# Patient Record
Sex: MALE | Race: BLACK OR AFRICAN AMERICAN | NOT HISPANIC OR LATINO | URBAN - METROPOLITAN AREA
[De-identification: names, ages, dates, MRNs, and addresses within clinical notes are randomized per-mention and may not be internally consistent; named-entity substitution may affect disease eponyms.]

---

## 2020-03-07 ENCOUNTER — EMERGENCY (EMERGENCY)
Facility: HOSPITAL | Age: 45
LOS: 1 days | Discharge: HOME | End: 2020-03-07
Attending: EMERGENCY MEDICINE
Payer: MEDICARE

## 2020-03-07 VITALS
RESPIRATION RATE: 18 BRPM | WEIGHT: 210.1 LBS | DIASTOLIC BLOOD PRESSURE: 82 MMHG | TEMPERATURE: 99 F | OXYGEN SATURATION: 98 % | SYSTOLIC BLOOD PRESSURE: 135 MMHG | HEART RATE: 98 BPM | HEIGHT: 66 IN

## 2020-03-07 PROCEDURE — 94640 AIRWAY INHALATION TREATMENT: CPT

## 2020-03-07 PROCEDURE — 99284 EMERGENCY DEPT VISIT MOD MDM: CPT

## 2020-03-07 PROCEDURE — 99283 EMERGENCY DEPT VISIT LOW MDM: CPT | Mod: 25

## 2020-03-07 RX ORDER — IPRATROPIUM/ALBUTEROL SULFATE 18-103MCG
3 AEROSOL WITH ADAPTER (GRAM) INHALATION ONCE
Refills: 0 | Status: COMPLETED | OUTPATIENT
Start: 2020-03-07 | End: 2020-03-07

## 2020-03-07 RX ORDER — ALBUTEROL 90 UG/1
2 AEROSOL, METERED ORAL
Qty: 1 | Refills: 0
Start: 2020-03-07 | End: 2020-03-07

## 2020-03-07 RX ADMIN — Medication 60 MILLIGRAM(S): at 14:28

## 2020-03-07 RX ADMIN — Medication 3 MILLILITER(S): at 14:28

## 2020-03-07 NOTE — ED PROVIDER NOTE - OBJECTIVE STATEMENT
44M h/o asthma presents to the ED c/o non productive cough x 1 week. Pt otherwise denies fever/chills, c/p, sob, abdominal pain, n/v, numbness/tinging, recent travel, +sick contacts and has no other complaints.

## 2020-03-07 NOTE — ED PROVIDER NOTE - PATIENT PORTAL LINK FT
You can access the FollowMyHealth Patient Portal offered by [facility] by registering at the following website: [email or web address]. By joining [employer]’s FollowMyHealth portal, you will also be able to view your health information using other applications (apps) compatible with our system.

## 2020-03-07 NOTE — ED PROVIDER NOTE - ATTENDING CONTRIBUTION TO CARE
I, Camron Cyr, performed a face to face bedside interview with this patient regarding history of present illness, review of symptoms and relevant past medical, social and family history.  I completed an independent physical examination. I have communicated the patient’s plan of care and disposition with the ACP.    44M h/o asthma presents to the ED c/o non productive cough x 1 week. Pt otherwise denies fever/chills, c/p, sob ; pe awake alert in nad heent ncat neck supple cor s1 s2 lungs mild wheeze b/l abd soft   dx asthma; nebs, steroids , reeval

## 2020-03-07 NOTE — ED PROVIDER NOTE - PROGRESS NOTE DETAILS
Pts lungs CTA b/l after nebs-- improved clinically. D/w Dr. Cyr-- pt stable for d/c with prednisone/albuterol and PCP f/u.

## 2020-03-22 ENCOUNTER — EMERGENCY (EMERGENCY)
Facility: HOSPITAL | Age: 45
LOS: 1 days | Discharge: HOME | End: 2020-03-22
Attending: EMERGENCY MEDICINE
Payer: MEDICARE

## 2020-03-22 VITALS
TEMPERATURE: 99 F | SYSTOLIC BLOOD PRESSURE: 146 MMHG | DIASTOLIC BLOOD PRESSURE: 77 MMHG | HEIGHT: 67 IN | WEIGHT: 209 LBS | RESPIRATION RATE: 20 BRPM | HEART RATE: 102 BPM | OXYGEN SATURATION: 93 %

## 2020-03-22 VITALS
RESPIRATION RATE: 20 BRPM | SYSTOLIC BLOOD PRESSURE: 139 MMHG | OXYGEN SATURATION: 97 % | HEART RATE: 103 BPM | DIASTOLIC BLOOD PRESSURE: 82 MMHG | TEMPERATURE: 99 F

## 2020-03-22 LAB
ALBUMIN SERPL ELPH-MCNC: 4 G/DL — SIGNIFICANT CHANGE UP (ref 3.3–5.2)
ALP SERPL-CCNC: 73 U/L — SIGNIFICANT CHANGE UP (ref 40–120)
ALT FLD-CCNC: 21 U/L — SIGNIFICANT CHANGE UP
ANION GAP SERPL CALC-SCNC: 15 MMOL/L — SIGNIFICANT CHANGE UP (ref 5–17)
ANISOCYTOSIS BLD QL: SLIGHT — SIGNIFICANT CHANGE UP
AST SERPL-CCNC: 14 U/L — SIGNIFICANT CHANGE UP
BASOPHILS # BLD AUTO: 0 K/UL — SIGNIFICANT CHANGE UP (ref 0–0.2)
BASOPHILS NFR BLD AUTO: 0 % — SIGNIFICANT CHANGE UP (ref 0–2)
BILIRUB SERPL-MCNC: <0.2 MG/DL — LOW (ref 0.4–2)
BUN SERPL-MCNC: 18 MG/DL — SIGNIFICANT CHANGE UP (ref 8–20)
CALCIUM SERPL-MCNC: 8.8 MG/DL — SIGNIFICANT CHANGE UP (ref 8.6–10.2)
CHLORIDE SERPL-SCNC: 101 MMOL/L — SIGNIFICANT CHANGE UP (ref 98–107)
CO2 SERPL-SCNC: 25 MMOL/L — SIGNIFICANT CHANGE UP (ref 22–29)
CREAT SERPL-MCNC: 1.1 MG/DL — SIGNIFICANT CHANGE UP (ref 0.5–1.3)
EOSINOPHIL # BLD AUTO: 0 K/UL — SIGNIFICANT CHANGE UP (ref 0–0.5)
EOSINOPHIL NFR BLD AUTO: 0 % — SIGNIFICANT CHANGE UP (ref 0–6)
GIANT PLATELETS BLD QL SMEAR: PRESENT — SIGNIFICANT CHANGE UP
GLUCOSE SERPL-MCNC: 110 MG/DL — HIGH (ref 70–99)
HCT VFR BLD CALC: 38.1 % — LOW (ref 39–50)
HGB BLD-MCNC: 12.2 G/DL — LOW (ref 13–17)
HYPOCHROMIA BLD QL: SLIGHT — SIGNIFICANT CHANGE UP
LACTATE BLDV-MCNC: 1.3 MMOL/L — SIGNIFICANT CHANGE UP (ref 0.5–2)
LYMPHOCYTES # BLD AUTO: 0.45 K/UL — LOW (ref 1–3.3)
LYMPHOCYTES # BLD AUTO: 8.1 % — LOW (ref 13–44)
MACROCYTES BLD QL: SLIGHT — SIGNIFICANT CHANGE UP
MANUAL SMEAR VERIFICATION: SIGNIFICANT CHANGE UP
MCHC RBC-ENTMCNC: 28.7 PG — SIGNIFICANT CHANGE UP (ref 27–34)
MCHC RBC-ENTMCNC: 32 GM/DL — SIGNIFICANT CHANGE UP (ref 32–36)
MCV RBC AUTO: 89.6 FL — SIGNIFICANT CHANGE UP (ref 80–100)
MICROCYTES BLD QL: SLIGHT — SIGNIFICANT CHANGE UP
MONOCYTES # BLD AUTO: 1.26 K/UL — HIGH (ref 0–0.9)
MONOCYTES NFR BLD AUTO: 22.5 % — HIGH (ref 2–14)
NEUTROPHILS # BLD AUTO: 3.79 K/UL — SIGNIFICANT CHANGE UP (ref 1.8–7.4)
NEUTROPHILS NFR BLD AUTO: 67.6 % — SIGNIFICANT CHANGE UP (ref 43–77)
PLAT MORPH BLD: NORMAL — SIGNIFICANT CHANGE UP
PLATELET # BLD AUTO: 149 K/UL — LOW (ref 150–400)
POLYCHROMASIA BLD QL SMEAR: SLIGHT — SIGNIFICANT CHANGE UP
POTASSIUM SERPL-MCNC: 3.4 MMOL/L — LOW (ref 3.5–5.3)
POTASSIUM SERPL-SCNC: 3.4 MMOL/L — LOW (ref 3.5–5.3)
PROT SERPL-MCNC: 6.8 G/DL — SIGNIFICANT CHANGE UP (ref 6.6–8.7)
RBC # BLD: 4.25 M/UL — SIGNIFICANT CHANGE UP (ref 4.2–5.8)
RBC # FLD: 14.2 % — SIGNIFICANT CHANGE UP (ref 10.3–14.5)
RBC BLD AUTO: ABNORMAL
SMUDGE CELLS # BLD: PRESENT — SIGNIFICANT CHANGE UP
SODIUM SERPL-SCNC: 141 MMOL/L — SIGNIFICANT CHANGE UP (ref 135–145)
VARIANT LYMPHS # BLD: 1.8 % — SIGNIFICANT CHANGE UP (ref 0–6)
WBC # BLD: 5.61 K/UL — SIGNIFICANT CHANGE UP (ref 3.8–10.5)
WBC # FLD AUTO: 5.61 K/UL — SIGNIFICANT CHANGE UP (ref 3.8–10.5)

## 2020-03-22 PROCEDURE — 71045 X-RAY EXAM CHEST 1 VIEW: CPT | Mod: 26

## 2020-03-22 PROCEDURE — 71045 X-RAY EXAM CHEST 1 VIEW: CPT

## 2020-03-22 PROCEDURE — 93010 ELECTROCARDIOGRAM REPORT: CPT

## 2020-03-22 PROCEDURE — 93005 ELECTROCARDIOGRAM TRACING: CPT

## 2020-03-22 PROCEDURE — 96374 THER/PROPH/DIAG INJ IV PUSH: CPT

## 2020-03-22 PROCEDURE — 94640 AIRWAY INHALATION TREATMENT: CPT

## 2020-03-22 PROCEDURE — 99284 EMERGENCY DEPT VISIT MOD MDM: CPT

## 2020-03-22 PROCEDURE — 83605 ASSAY OF LACTIC ACID: CPT

## 2020-03-22 PROCEDURE — 36415 COLL VENOUS BLD VENIPUNCTURE: CPT

## 2020-03-22 PROCEDURE — 99285 EMERGENCY DEPT VISIT HI MDM: CPT | Mod: 25

## 2020-03-22 PROCEDURE — 85027 COMPLETE CBC AUTOMATED: CPT

## 2020-03-22 PROCEDURE — 80053 COMPREHEN METABOLIC PANEL: CPT

## 2020-03-22 RX ORDER — IPRATROPIUM/ALBUTEROL SULFATE 18-103MCG
1 AEROSOL WITH ADAPTER (GRAM) INHALATION ONCE
Refills: 0 | Status: COMPLETED | OUTPATIENT
Start: 2020-03-22 | End: 2020-03-22

## 2020-03-22 RX ORDER — ALBUTEROL 90 UG/1
2 AEROSOL, METERED ORAL
Qty: 1 | Refills: 0
Start: 2020-03-22 | End: 2020-04-20

## 2020-03-22 RX ORDER — MAGNESIUM SULFATE 500 MG/ML
2 VIAL (ML) INJECTION ONCE
Refills: 0 | Status: COMPLETED | OUTPATIENT
Start: 2020-03-22 | End: 2020-03-22

## 2020-03-22 RX ORDER — SODIUM CHLORIDE 9 MG/ML
1000 INJECTION INTRAMUSCULAR; INTRAVENOUS; SUBCUTANEOUS ONCE
Refills: 0 | Status: COMPLETED | OUTPATIENT
Start: 2020-03-22 | End: 2020-03-22

## 2020-03-22 RX ORDER — AZITHROMYCIN 500 MG/1
1 TABLET, FILM COATED ORAL
Qty: 3 | Refills: 0
Start: 2020-03-22 | End: 2020-03-24

## 2020-03-22 RX ADMIN — SODIUM CHLORIDE 1000 MILLILITER(S): 9 INJECTION INTRAMUSCULAR; INTRAVENOUS; SUBCUTANEOUS at 11:58

## 2020-03-22 RX ADMIN — Medication 50 GRAM(S): at 11:57

## 2020-03-22 RX ADMIN — Medication 50 MILLIGRAM(S): at 11:57

## 2020-03-22 RX ADMIN — Medication 1 PUFF(S): at 11:58

## 2020-03-22 NOTE — ED PROVIDER NOTE - NSFOLLOWUPINSTRUCTIONS_ED_ALL_ED_FT
take meds as prescribed  drink lots of fluids  stay at home until symptoms resolve  wash hands frequently

## 2020-03-22 NOTE — ED PROVIDER NOTE - CLINICAL SUMMARY MEDICAL DECISION MAKING FREE TEXT BOX
45 y/o male with unchanged SOB and dry cough x week and a half.  pt has mild  tachypnea and hypoxia poor aeration on auscultation. will obtain chest xray to rule out pneumonia, labs, will give steroids , combivent, IV fluids, magnesium, EKG and will reassess.

## 2020-03-22 NOTE — ED ADULT TRIAGE NOTE - CHIEF COMPLAINT QUOTE
patient seen for continuos cough, patient with aide - patient states that he has asthma and that he has the pump. but continues to cough

## 2020-03-22 NOTE — ED PROVIDER NOTE - PATIENT PORTAL LINK FT
You can access the FollowMyHealth Patient Portal offered by [facility] by registering at the following website: [email or web address]. By joining [employer]’s FollowMyHealth portal, you will also be able to view your health information using other applications (apps) compatible with our system.

## 2020-03-22 NOTE — ED PROVIDER NOTE - OBJECTIVE STATEMENT
43 y/o male with dry cough and SOB x 1 week and a half. Pt denies HA/ N/V/D, fever/chills, chest or abdominal pain. No recent travel. Pt is asthmatic. Pt was here a week ago for same complaint. pt was given an inhaler with no relief.

## 2020-03-22 NOTE — ED PROVIDER NOTE - ATTENDING CONTRIBUTION TO CARE
Oneil MARTINEZ- 43 Y/O M with h/o paranoid schizophrenia, asthma, lives with brother who is HCP at bedside p/w persistent cough for 10 days no sputum production but now sob, no fever, chills, nausea, vomiting. Pt was given albuterol neb and steroids w/o relief, non-smoker. Pt went to day program and was sent to ED    Pt is alert, well appearing male, speaking in full sentences, appear to have upper airway congestion, nasal secretions but no drooling b/l poor aeration but equal breath sounds and no wheezing, trachea midlines, mild tachypnea, mild hypoxia to 93% at RA, no retraction or acute resp distress, abd soft, nt, nd, neuro exam aox4, cn 2-12 intact, no focal deficits, skin warm, dry, mod turgor    Plan to treat with combivent inhaler, prednisone, Mag iv and check labs, cxr, hydrate, ekg and reassess

## 2020-03-22 NOTE — ED PROVIDER NOTE - PROGRESS NOTE DETAILS
pt discharged home with azithro, prednisone, advsid to continue labuterol an combivent, flovent at home

## 2020-03-28 ENCOUNTER — EMERGENCY (EMERGENCY)
Facility: HOSPITAL | Age: 45
LOS: 1 days | Discharge: HOME | End: 2020-03-28
Attending: EMERGENCY MEDICINE | Admitting: EMERGENCY MEDICINE
Payer: MEDICARE

## 2020-03-28 VITALS
SYSTOLIC BLOOD PRESSURE: 145 MMHG | HEART RATE: 86 BPM | HEIGHT: 67 IN | RESPIRATION RATE: 16 BRPM | OXYGEN SATURATION: 99 % | TEMPERATURE: 98 F | DIASTOLIC BLOOD PRESSURE: 71 MMHG | WEIGHT: 210.1 LBS

## 2020-03-28 PROCEDURE — 99283 EMERGENCY DEPT VISIT LOW MDM: CPT

## 2020-03-28 RX ORDER — FLUTICASONE PROPIONATE 50 MCG
1 SPRAY, SUSPENSION NASAL
Qty: 1 | Refills: 0
Start: 2020-03-28 | End: 2020-04-26

## 2020-03-28 NOTE — ED PROVIDER NOTE - CARE PROVIDER_API CALL
Hazel Espinoza (DO)  [place] [address]  Phone: [phone]  Fax: [phone]  Follow Up Time:

## 2020-03-28 NOTE — ED PROVIDER NOTE - OBJECTIVE STATEMENT
pt is a 43yo male with pmhx of schizophrenia presents with cough x 6 weeks. pt reports nasal congestion with productive cough with green sputum. pt used cough drops and vicks which provided relief. pt seen at [facility] a few days ago and had neg work up, neg cxr.

## 2020-03-28 NOTE — ED PROVIDER NOTE - NSFOLLOWUPINSTRUCTIONS_ED_ALL_ED_FT
1. FOLLOW UP WITH YOUR PRIMARY DOCTOR IN 24-48 HOURS.   2. FOLLOW UP WITH ALL SPECIALIST DISCUSSED DURING YOUR VISIT.   3. TAKE ALL MEDICATIONS PRESCRIBED IN THE ER IF ANY ARE PRESCRIBED. CONTINUE YOUR HOME MEDICATIONS UNLESS OTHERWISE ADVISED DIFFERENTLY.   4. RETURN FOR WORSENING SYMPTOMS OR CONCERNS INCLUDING BUT NOT LIMITED TO FEVER, CHEST PAIN, OR TROUBLE BREATHING OR ANY OTHER CONCERNS  continue home medications prescribed from [facility]. add flonase and tessalon.

## 2020-03-28 NOTE — ED PROVIDER NOTE - ATTENDING CONTRIBUTION TO CARE
45 yo male pmhx of schizophrenia c/o cough x 6 weeks with some nasal congestion with green sputum.  Taking cough drops and vicks which helped him, [facility], work up negative and CXR clear.    Gen: Alert, NAD  Head/eyes: NC/AT, PERRL  ENT: airway patent  Neck: supple, no tenderness/meningismus/JVD, Trachea midline  Pulm/lung: Bilateral clear BS, normal resp effort, no wheeze/stridor/retractions  CV/heart: RRR, no M/R/G, +2 dist pulses (radial, pedal DP/PT, popliteal)  GI/Abd: soft, NT/ND, +BS, no guarding/rebound tenderness  Musculoskeletal: no edema/erythema/cyanosis, FROM in all extremities, no C/T/L spine ttp  Skin: no rash, no vesicles, no petechaie, no ecchymosis, no swelling  Neuro: AAOx3, CN 2-12 intact, normal sensation, 5/5 motor strength in all extremities    reassurance likely viral syndrome, f/u with pmd as needed

## 2020-03-28 NOTE — ED PROVIDER NOTE - PATIENT PORTAL LINK FT
You can access the FollowMyHealth Patient Portal offered by [facility] by registering at the following website: [email or web address]. By joining [employer]’s FollowMyHealth portal, you will also be able to view your health information using other applications (apps) compatible with our system.

## 2020-03-28 NOTE — ED ADULT NURSE NOTE - NSIMPLEMENTINTERV_GEN_ALL_ED
Implemented All Universal Safety Interventions:  [place] to call system. Call bell, personal items and telephone within reach. Instruct patient to call for assistance. Room bathroom lighting operational. Non-slip footwear when patient is off stretcher. Physically safe environment: no spills, clutter or unnecessary equipment. Stretcher in lowest position, wheels locked, appropriate side rails in place.

## 2020-03-28 NOTE — ED PROVIDER NOTE - CLINICAL SUMMARY MEDICAL DECISION MAKING FREE TEXT BOX
pt with probable viral syndrome. advised pmd follow up. will rx flonase and tessalon. All questions answered and concerns addressed. pt verbalized understanding and agreement with plan and dx. pt advised on next step and when/where to follow up. pt advised on all take home and otc medications. pt advised to follow up with PMD. pt advised to return to ed for worsenng symptoms including fever, cp, sob. will dc.

## 2020-05-23 ENCOUNTER — EMERGENCY (EMERGENCY)
Facility: HOSPITAL | Age: 45
LOS: 1 days | Discharge: HOME | End: 2020-05-23
Attending: EMERGENCY MEDICINE | Admitting: EMERGENCY MEDICINE
Payer: MEDICARE

## 2020-05-23 VITALS
WEIGHT: 210.1 LBS | SYSTOLIC BLOOD PRESSURE: 120 MMHG | HEIGHT: 67 IN | DIASTOLIC BLOOD PRESSURE: 70 MMHG | RESPIRATION RATE: 18 BRPM | HEART RATE: 99 BPM | OXYGEN SATURATION: 97 % | TEMPERATURE: 99 F

## 2020-05-23 VITALS
HEART RATE: 89 BPM | TEMPERATURE: 98 F | RESPIRATION RATE: 16 BRPM | DIASTOLIC BLOOD PRESSURE: 76 MMHG | OXYGEN SATURATION: 98 % | SYSTOLIC BLOOD PRESSURE: 134 MMHG

## 2020-05-23 LAB
ALBUMIN SERPL ELPH-MCNC: 3.3 G/DL — SIGNIFICANT CHANGE UP (ref 3.3–5)
ALP SERPL-CCNC: 63 U/L — SIGNIFICANT CHANGE UP (ref 40–120)
ALT FLD-CCNC: 27 U/L — SIGNIFICANT CHANGE UP (ref 12–78)
ANION GAP SERPL CALC-SCNC: 4 MMOL/L — LOW (ref 5–17)
AST SERPL-CCNC: 13 U/L — LOW (ref 15–37)
BASOPHILS # BLD AUTO: 0.02 K/UL — SIGNIFICANT CHANGE UP (ref 0–0.2)
BASOPHILS NFR BLD AUTO: 0.3 % — SIGNIFICANT CHANGE UP (ref 0–2)
BILIRUB SERPL-MCNC: 0.2 MG/DL — SIGNIFICANT CHANGE UP (ref 0.2–1.2)
BUN SERPL-MCNC: 18 MG/DL — SIGNIFICANT CHANGE UP (ref 7–23)
CALCIUM SERPL-MCNC: 8.3 MG/DL — LOW (ref 8.5–10.1)
CHLORIDE SERPL-SCNC: 109 MMOL/L — HIGH (ref 96–108)
CO2 SERPL-SCNC: 29 MMOL/L — SIGNIFICANT CHANGE UP (ref 22–31)
CREAT SERPL-MCNC: 1 MG/DL — SIGNIFICANT CHANGE UP (ref 0.5–1.3)
D DIMER BLD IA.RAPID-MCNC: <150 NG/ML DDU — SIGNIFICANT CHANGE UP
EOSINOPHIL # BLD AUTO: 0.03 K/UL — SIGNIFICANT CHANGE UP (ref 0–0.5)
EOSINOPHIL NFR BLD AUTO: 0.5 % — SIGNIFICANT CHANGE UP (ref 0–6)
GLUCOSE SERPL-MCNC: 79 MG/DL — SIGNIFICANT CHANGE UP (ref 70–99)
HCT VFR BLD CALC: 37.8 % — LOW (ref 39–50)
HGB BLD-MCNC: 12.1 G/DL — LOW (ref 13–17)
IMM GRANULOCYTES NFR BLD AUTO: 0.3 % — SIGNIFICANT CHANGE UP (ref 0–1.5)
LYMPHOCYTES # BLD AUTO: 1.54 K/UL — SIGNIFICANT CHANGE UP (ref 1–3.3)
LYMPHOCYTES # BLD AUTO: 24 % — SIGNIFICANT CHANGE UP (ref 13–44)
MCHC RBC-ENTMCNC: 28.7 PG — SIGNIFICANT CHANGE UP (ref 27–34)
MCHC RBC-ENTMCNC: 32 GM/DL — SIGNIFICANT CHANGE UP (ref 32–36)
MCV RBC AUTO: 89.6 FL — SIGNIFICANT CHANGE UP (ref 80–100)
MONOCYTES # BLD AUTO: 0.78 K/UL — SIGNIFICANT CHANGE UP (ref 0–0.9)
MONOCYTES NFR BLD AUTO: 12.1 % — SIGNIFICANT CHANGE UP (ref 2–14)
NEUTROPHILS # BLD AUTO: 4.04 K/UL — SIGNIFICANT CHANGE UP (ref 1.8–7.4)
NEUTROPHILS NFR BLD AUTO: 62.8 % — SIGNIFICANT CHANGE UP (ref 43–77)
NRBC # BLD: 0 /100 WBCS — SIGNIFICANT CHANGE UP (ref 0–0)
PLATELET # BLD AUTO: 179 K/UL — SIGNIFICANT CHANGE UP (ref 150–400)
POTASSIUM SERPL-MCNC: 4.3 MMOL/L — SIGNIFICANT CHANGE UP (ref 3.5–5.3)
POTASSIUM SERPL-SCNC: 4.3 MMOL/L — SIGNIFICANT CHANGE UP (ref 3.5–5.3)
PROT SERPL-MCNC: 7.2 G/DL — SIGNIFICANT CHANGE UP (ref 6–8.3)
RBC # BLD: 4.22 M/UL — SIGNIFICANT CHANGE UP (ref 4.2–5.8)
RBC # FLD: 14.3 % — SIGNIFICANT CHANGE UP (ref 10.3–14.5)
SODIUM SERPL-SCNC: 142 MMOL/L — SIGNIFICANT CHANGE UP (ref 135–145)
TROPONIN I SERPL-MCNC: <.015 NG/ML — SIGNIFICANT CHANGE UP (ref 0.01–0.04)
WBC # BLD: 6.43 K/UL — SIGNIFICANT CHANGE UP (ref 3.8–10.5)
WBC # FLD AUTO: 6.43 K/UL — SIGNIFICANT CHANGE UP (ref 3.8–10.5)

## 2020-05-23 PROCEDURE — 93005 ELECTROCARDIOGRAM TRACING: CPT

## 2020-05-23 PROCEDURE — 99283 EMERGENCY DEPT VISIT LOW MDM: CPT

## 2020-05-23 PROCEDURE — 93010 ELECTROCARDIOGRAM REPORT: CPT

## 2020-05-23 PROCEDURE — 99283 EMERGENCY DEPT VISIT LOW MDM: CPT | Mod: 25

## 2020-05-23 PROCEDURE — 80053 COMPREHEN METABOLIC PANEL: CPT

## 2020-05-23 PROCEDURE — 85027 COMPLETE CBC AUTOMATED: CPT

## 2020-05-23 PROCEDURE — 84484 ASSAY OF TROPONIN QUANT: CPT

## 2020-05-23 PROCEDURE — 85379 FIBRIN DEGRADATION QUANT: CPT

## 2020-05-23 PROCEDURE — 71045 X-RAY EXAM CHEST 1 VIEW: CPT

## 2020-05-23 PROCEDURE — 36415 COLL VENOUS BLD VENIPUNCTURE: CPT

## 2020-05-23 PROCEDURE — 71045 X-RAY EXAM CHEST 1 VIEW: CPT | Mod: 26

## 2020-05-23 NOTE — ED PROVIDER NOTE - OBJECTIVE STATEMENT
44 y/o male with PMHx Schizophrenia BIB brother due to cough and SOB. Reports patient has been persistently coughing and worse at night. Reports taking OTC cough medication without relief. Reports testing positive for COVID last month. Denies cp, hemoptysis, leg swelling, calf pain, hx of DVT, palpitations, pain with deep breathing, or any other complaints.

## 2020-05-23 NOTE — ED PROVIDER NOTE - NSFOLLOWUPINSTRUCTIONS_ED_ALL_ED_FT
Shortness of breath (dyspnea) means you have trouble breathing and could indicate a medical problem. Causes include lung disease, heart disease, low amount of red blood cells (anemia), poor physical fitness, being overweight, smoking, etc. Your health care provider today may not be able to find a cause for your shortness of breath after your exam. In this case, it is important to have a follow-up exam with your primary care physician as instructed. If medicines were prescribed, take them as directed for the full length of time directed. Refrain from tobacco products.    Follow up with Pulmonology.     SEEK IMMEDIATE MEDICAL CARE IF YOU HAVE ANY OF THE FOLLOWING SYMPTOMS: worsening shortness of breath, chest pain, back pain, abdominal pain, fever, coughing up blood, lightheadedness/dizziness.

## 2020-05-23 NOTE — ED PROVIDER NOTE - PHYSICAL EXAMINATION
Constitutional: Awake, Alert, non-toxic. NAD. Well appearing, well nourished.   HEAD: Normocephalic, atraumatic.   EYES: EOM intact, conjunctiva and sclera are clear bilaterally.   ENT: No rhinorrhea, patent, mucous membranes pink/moist, no drooling or stridor.   NECK: Supple, non-tender  CARDIOVASCULAR: Normal S1, S2; regular rate and rhythm.  RESPIRATORY: Normal respiratory effort; breath sounds CTAB, no wheezes, rhonchi, or rales. Speaking in full sentences. No accessory muscle use.   ABDOMEN: Soft; non-tender, non-distended.   EXTREMITIES: Full passive and active ROM in all extremities; non-tender to palpation; distal pulses palpable and symmetric, no LE edema, negative Jan sign.   SKIN: Warm, dry; good skin turgor, no apparent lesions or rashes, no ecchymosis, brisk capillary refill.  NEURO: A&O x3. Sensory and motor functions are grossly intact. Speech is normal. Appearance and judgement seem appropriate for gender and age.

## 2020-05-23 NOTE — ED PROVIDER NOTE - PROGRESS NOTE DETAILS
Reevaluated patient at bedside. Labs wnl, negative troponin and D-dimer. Tessalon rxed.  Discussed results with the patient and provided copies.  All questions were answered. Discussed the importance of prompt, close medical follow-up. Patient will return with any changes, concerns or persistent/worsening symptoms.  Patient verbalized understanding.

## 2020-05-23 NOTE — ED PROVIDER NOTE - NSFOLLOWUPCLINICS_GEN_ALL_ED_FT
[facility] Pulmonolgy and Sleep Medicine  Pulmonology  [address] [place] [address], [facility] [address]  [place] [address]  Phone: [phone]  Fax:   Follow Up Time: 1-3 Days

## 2020-05-23 NOTE — ED PROVIDER NOTE - PATIENT PORTAL LINK FT
You can access the FollowMyHealth Patient Portal offered by Health system by registering at the following website: [email or web address]. By joining [employer]’s FollowMyHealth portal, you will also be able to view your health information using other applications (apps) compatible with our system.

## 2020-05-23 NOTE — ED ADULT NURSE REASSESSMENT NOTE - NS ED NURSE REASSESS COMMENT FT1
Patient sitting at the bedside with brother provided with apple and cranberry juice informed of the plan of care with understanding.

## 2020-05-23 NOTE — ED PROVIDER NOTE - ATTENDING CONTRIBUTION TO CARE
46 y/o M paranoid schizophrenic with c/o intermittent cough x few months.  pt was positive for covid in April.  pt well appearing, lungs clear.  xray unremarkable.  EKG with nonspecific ST changes in v3, v4.  labs trop, dc for outp fu.

## 2020-05-23 NOTE — ED PROVIDER NOTE - CLINICAL SUMMARY MEDICAL DECISION MAKING FREE TEXT BOX
c/o cough and SOB. COVID positive last month. PLan includes CXR r/o pneumonia, Ekg r/o CAD. re-assess

## 2020-12-14 ENCOUNTER — INPATIENT (INPATIENT)
Facility: HOSPITAL | Age: 45
LOS: 3 days | Discharge: HOME | DRG: 392 | End: 2020-12-18
Attending: HOSPITALIST | Admitting: STUDENT IN AN ORGANIZED HEALTH CARE EDUCATION/TRAINING PROGRAM
Payer: MEDICARE

## 2020-12-14 VITALS
SYSTOLIC BLOOD PRESSURE: 145 MMHG | RESPIRATION RATE: 18 BRPM | OXYGEN SATURATION: 96 % | HEIGHT: 67 IN | HEART RATE: 90 BPM | TEMPERATURE: 98 F | DIASTOLIC BLOOD PRESSURE: 92 MMHG

## 2020-12-14 PROCEDURE — 99285 EMERGENCY DEPT VISIT HI MDM: CPT

## 2020-12-14 NOTE — ED ADULT TRIAGE NOTE - CHIEF COMPLAINT QUOTE
brother reports pt has had a cough for the past 6 months however recently pt has been choking on his food often, required the hiemlick maneuver twice today. Pt in no distress in triage.

## 2020-12-15 DIAGNOSIS — R13.10 DYSPHAGIA, UNSPECIFIED: ICD-10-CM

## 2020-12-15 DIAGNOSIS — F20.0 PARANOID SCHIZOPHRENIA: ICD-10-CM

## 2020-12-15 DIAGNOSIS — D64.9 ANEMIA, UNSPECIFIED: ICD-10-CM

## 2020-12-15 DIAGNOSIS — R05 COUGH: ICD-10-CM

## 2020-12-15 DIAGNOSIS — Z29.9 ENCOUNTER FOR PROPHYLACTIC MEASURES, UNSPECIFIED: ICD-10-CM

## 2020-12-15 DIAGNOSIS — T17.308A UNSPECIFIED FOREIGN BODY IN LARYNX CAUSING OTHER INJURY, INITIAL ENCOUNTER: ICD-10-CM

## 2020-12-15 LAB
ALBUMIN SERPL ELPH-MCNC: 3.2 G/DL — LOW (ref 3.3–5)
ALBUMIN SERPL ELPH-MCNC: 3.5 G/DL — SIGNIFICANT CHANGE UP (ref 3.3–5)
ALP SERPL-CCNC: 67 U/L — SIGNIFICANT CHANGE UP (ref 40–120)
ALP SERPL-CCNC: 71 U/L — SIGNIFICANT CHANGE UP (ref 40–120)
ALT FLD-CCNC: 24 U/L — SIGNIFICANT CHANGE UP (ref 12–78)
ALT FLD-CCNC: 28 U/L — SIGNIFICANT CHANGE UP (ref 12–78)
ANION GAP SERPL CALC-SCNC: 10 MMOL/L — SIGNIFICANT CHANGE UP (ref 5–17)
ANION GAP SERPL CALC-SCNC: 9 MMOL/L — SIGNIFICANT CHANGE UP (ref 5–17)
APTT BLD: 45.3 SEC — HIGH (ref 27.5–35.5)
AST SERPL-CCNC: 10 U/L — LOW (ref 15–37)
AST SERPL-CCNC: 12 U/L — LOW (ref 15–37)
BILIRUB SERPL-MCNC: 0.2 MG/DL — SIGNIFICANT CHANGE UP (ref 0.2–1.2)
BILIRUB SERPL-MCNC: 0.2 MG/DL — SIGNIFICANT CHANGE UP (ref 0.2–1.2)
BUN SERPL-MCNC: 20 MG/DL — SIGNIFICANT CHANGE UP (ref 7–23)
BUN SERPL-MCNC: 24 MG/DL — HIGH (ref 7–23)
CALCIUM SERPL-MCNC: 8.3 MG/DL — LOW (ref 8.5–10.1)
CALCIUM SERPL-MCNC: 8.3 MG/DL — LOW (ref 8.5–10.1)
CHLORIDE SERPL-SCNC: 106 MMOL/L — SIGNIFICANT CHANGE UP (ref 96–108)
CHLORIDE SERPL-SCNC: 107 MMOL/L — SIGNIFICANT CHANGE UP (ref 96–108)
CO2 SERPL-SCNC: 26 MMOL/L — SIGNIFICANT CHANGE UP (ref 22–31)
CO2 SERPL-SCNC: 29 MMOL/L — SIGNIFICANT CHANGE UP (ref 22–31)
CREAT SERPL-MCNC: 0.94 MG/DL — SIGNIFICANT CHANGE UP (ref 0.5–1.3)
CREAT SERPL-MCNC: 1 MG/DL — SIGNIFICANT CHANGE UP (ref 0.5–1.3)
FERRITIN SERPL-MCNC: 85 NG/ML — SIGNIFICANT CHANGE UP (ref 30–400)
GLUCOSE SERPL-MCNC: 87 MG/DL — SIGNIFICANT CHANGE UP (ref 70–99)
GLUCOSE SERPL-MCNC: 95 MG/DL — SIGNIFICANT CHANGE UP (ref 70–99)
HCT VFR BLD CALC: 37.1 % — LOW (ref 39–50)
HCT VFR BLD CALC: 38.1 % — LOW (ref 39–50)
HGB BLD-MCNC: 11.8 G/DL — LOW (ref 13–17)
HGB BLD-MCNC: 12.3 G/DL — LOW (ref 13–17)
INR BLD: 1.29 RATIO — HIGH (ref 0.88–1.16)
IRON SATN MFR SERPL: 17 % — SIGNIFICANT CHANGE UP (ref 16–55)
IRON SATN MFR SERPL: 57 UG/DL — SIGNIFICANT CHANGE UP (ref 45–165)
MCHC RBC-ENTMCNC: 28.4 PG — SIGNIFICANT CHANGE UP (ref 27–34)
MCHC RBC-ENTMCNC: 29 PG — SIGNIFICANT CHANGE UP (ref 27–34)
MCHC RBC-ENTMCNC: 31.8 GM/DL — LOW (ref 32–36)
MCHC RBC-ENTMCNC: 32.3 GM/DL — SIGNIFICANT CHANGE UP (ref 32–36)
MCV RBC AUTO: 89.4 FL — SIGNIFICANT CHANGE UP (ref 80–100)
MCV RBC AUTO: 89.9 FL — SIGNIFICANT CHANGE UP (ref 80–100)
NRBC # BLD: 0 /100 WBCS — SIGNIFICANT CHANGE UP (ref 0–0)
NRBC # BLD: 0 /100 WBCS — SIGNIFICANT CHANGE UP (ref 0–0)
PLATELET # BLD AUTO: 224 K/UL — SIGNIFICANT CHANGE UP (ref 150–400)
PLATELET # BLD AUTO: 243 K/UL — SIGNIFICANT CHANGE UP (ref 150–400)
POTASSIUM SERPL-MCNC: 3.9 MMOL/L — SIGNIFICANT CHANGE UP (ref 3.5–5.3)
POTASSIUM SERPL-MCNC: 4.3 MMOL/L — SIGNIFICANT CHANGE UP (ref 3.5–5.3)
POTASSIUM SERPL-SCNC: 3.9 MMOL/L — SIGNIFICANT CHANGE UP (ref 3.5–5.3)
POTASSIUM SERPL-SCNC: 4.3 MMOL/L — SIGNIFICANT CHANGE UP (ref 3.5–5.3)
PROT SERPL-MCNC: 7.2 G/DL — SIGNIFICANT CHANGE UP (ref 6–8.3)
PROT SERPL-MCNC: 7.7 G/DL — SIGNIFICANT CHANGE UP (ref 6–8.3)
PROTHROM AB SERPL-ACNC: 14.9 SEC — HIGH (ref 10.6–13.6)
RBC # BLD: 4.15 M/UL — LOW (ref 4.2–5.8)
RBC # BLD: 4.24 M/UL — SIGNIFICANT CHANGE UP (ref 4.2–5.8)
RBC # FLD: 13.5 % — SIGNIFICANT CHANGE UP (ref 10.3–14.5)
RBC # FLD: 13.5 % — SIGNIFICANT CHANGE UP (ref 10.3–14.5)
SARS-COV-2 RNA SPEC QL NAA+PROBE: SIGNIFICANT CHANGE UP
SODIUM SERPL-SCNC: 143 MMOL/L — SIGNIFICANT CHANGE UP (ref 135–145)
SODIUM SERPL-SCNC: 144 MMOL/L — SIGNIFICANT CHANGE UP (ref 135–145)
TIBC SERPL-MCNC: 339 UG/DL — SIGNIFICANT CHANGE UP (ref 220–430)
TSH SERPL-MCNC: 1.03 UIU/ML — SIGNIFICANT CHANGE UP (ref 0.36–3.74)
UIBC SERPL-MCNC: 282 UG/DL — SIGNIFICANT CHANGE UP (ref 110–370)
WBC # BLD: 6.74 K/UL — SIGNIFICANT CHANGE UP (ref 3.8–10.5)
WBC # BLD: 7 K/UL — SIGNIFICANT CHANGE UP (ref 3.8–10.5)
WBC # FLD AUTO: 6.74 K/UL — SIGNIFICANT CHANGE UP (ref 3.8–10.5)
WBC # FLD AUTO: 7 K/UL — SIGNIFICANT CHANGE UP (ref 3.8–10.5)

## 2020-12-15 PROCEDURE — 99222 1ST HOSP IP/OBS MODERATE 55: CPT | Mod: GC,AI

## 2020-12-15 PROCEDURE — 70491 CT SOFT TISSUE NECK W/DYE: CPT | Mod: 26

## 2020-12-15 PROCEDURE — 71260 CT THORAX DX C+: CPT | Mod: 26

## 2020-12-15 PROCEDURE — 70450 CT HEAD/BRAIN W/O DYE: CPT | Mod: 26

## 2020-12-15 PROCEDURE — 93010 ELECTROCARDIOGRAM REPORT: CPT

## 2020-12-15 RX ORDER — CLOZAPINE 150 MG/1
300 TABLET, ORALLY DISINTEGRATING ORAL ONCE
Refills: 0 | Status: COMPLETED | OUTPATIENT
Start: 2020-12-15 | End: 2020-12-15

## 2020-12-15 RX ORDER — DIVALPROEX SODIUM 500 MG/1
1500 TABLET, DELAYED RELEASE ORAL ONCE
Refills: 0 | Status: COMPLETED | OUTPATIENT
Start: 2020-12-15 | End: 2020-12-15

## 2020-12-15 RX ORDER — DIVALPROEX SODIUM 500 MG/1
1500 TABLET, DELAYED RELEASE ORAL AT BEDTIME
Refills: 0 | Status: DISCONTINUED | OUTPATIENT
Start: 2020-12-15 | End: 2020-12-18

## 2020-12-15 RX ORDER — DIVALPROEX SODIUM 500 MG/1
500 TABLET, DELAYED RELEASE ORAL
Refills: 0 | Status: DISCONTINUED | OUTPATIENT
Start: 2020-12-15 | End: 2020-12-18

## 2020-12-15 RX ORDER — PANTOPRAZOLE SODIUM 20 MG/1
40 TABLET, DELAYED RELEASE ORAL
Refills: 0 | Status: DISCONTINUED | OUTPATIENT
Start: 2020-12-15 | End: 2020-12-17

## 2020-12-15 RX ORDER — PANTOPRAZOLE SODIUM 20 MG/1
40 TABLET, DELAYED RELEASE ORAL ONCE
Refills: 0 | Status: COMPLETED | OUTPATIENT
Start: 2020-12-15 | End: 2020-12-15

## 2020-12-15 RX ORDER — CLOZAPINE 150 MG/1
300 TABLET, ORALLY DISINTEGRATING ORAL AT BEDTIME
Refills: 0 | Status: DISCONTINUED | OUTPATIENT
Start: 2020-12-15 | End: 2020-12-18

## 2020-12-15 RX ORDER — SODIUM CHLORIDE 9 MG/ML
1000 INJECTION INTRAMUSCULAR; INTRAVENOUS; SUBCUTANEOUS ONCE
Refills: 0 | Status: COMPLETED | OUTPATIENT
Start: 2020-12-15 | End: 2020-12-15

## 2020-12-15 RX ORDER — CLOZAPINE 150 MG/1
300 TABLET, ORALLY DISINTEGRATING ORAL
Refills: 0 | Status: DISCONTINUED | OUTPATIENT
Start: 2020-12-16 | End: 2020-12-15

## 2020-12-15 RX ORDER — SODIUM CHLORIDE 0.65 %
1 AEROSOL, SPRAY (ML) NASAL
Refills: 0 | Status: DISCONTINUED | OUTPATIENT
Start: 2020-12-15 | End: 2020-12-18

## 2020-12-15 RX ADMIN — DIVALPROEX SODIUM 1500 MILLIGRAM(S): 500 TABLET, DELAYED RELEASE ORAL at 22:34

## 2020-12-15 RX ADMIN — Medication 1 SPRAY(S): at 18:26

## 2020-12-15 RX ADMIN — CLOZAPINE 300 MILLIGRAM(S): 150 TABLET, ORALLY DISINTEGRATING ORAL at 22:39

## 2020-12-15 RX ADMIN — SODIUM CHLORIDE 1000 MILLILITER(S): 9 INJECTION INTRAMUSCULAR; INTRAVENOUS; SUBCUTANEOUS at 01:13

## 2020-12-15 RX ADMIN — Medication 200 MILLIGRAM(S): at 22:36

## 2020-12-15 RX ADMIN — PANTOPRAZOLE SODIUM 40 MILLIGRAM(S): 20 TABLET, DELAYED RELEASE ORAL at 03:47

## 2020-12-15 RX ADMIN — SODIUM CHLORIDE 1000 MILLILITER(S): 9 INJECTION INTRAMUSCULAR; INTRAVENOUS; SUBCUTANEOUS at 03:33

## 2020-12-15 RX ADMIN — DIVALPROEX SODIUM 1500 MILLIGRAM(S): 500 TABLET, DELAYED RELEASE ORAL at 05:51

## 2020-12-15 RX ADMIN — CLOZAPINE 300 MILLIGRAM(S): 150 TABLET, ORALLY DISINTEGRATING ORAL at 05:51

## 2020-12-15 NOTE — ED PROVIDER NOTE - CARE PLAN
Principal Discharge DX:	Dysphagia   Principal Discharge DX:	Dysphagia  Secondary Diagnosis:	Choking

## 2020-12-15 NOTE — SWALLOW BEDSIDE ASSESSMENT ADULT - COMMENTS
Per charting, the patient is a "46 y/o male with PMHx Schizophrenia presents to the ED with 2 episodes of choking. Admitted for dysphagia workup."    CT CHEST 12/15: " Clear lungs without evidence for aspiration pneumonia or pneumonitis. However, mild diffuse bronchial wall thickening is likely inflammatory related to history of bronchial asthma, and less likely due to an infectious bronchitis."    CT HEAD 12/15: "No gross evidence of acute intracranial hemorrhage, midline shift or CT evidence of acute territorial infarct."    Consult received and chart reviewed. The patient was seen at bedside for an assessment of swallow function, at which time he was awake/alert and oriented X3. The patient was able to follow simple directives, answer yes/no questions and communicate basic wants/needs. Patient's brother reported patient with history of chronic cough with subsequent new onset choking episodes. RN present for evaluation.

## 2020-12-15 NOTE — ED ADULT NURSE NOTE - OBJECTIVE STATEMENT
received pt stable awake with c/o  cough x 6months c/o choking  pt evaluated and blood work  drawned and sent to lab ivf infusing well  awaiting ct scan

## 2020-12-15 NOTE — H&P ADULT - PROBLEM SELECTOR PLAN 2
Chronic, stable   - Continue home dose Clozapine   - Continue home dose Depakote  - Chronic, stable   - Continue home dose Clozapine   - Continue home dose Depakote

## 2020-12-15 NOTE — PROGRESS NOTE ADULT - ASSESSMENT
The patient is a 45-year-old man with PMH of Schizophrenia who presented to the ED with two episodes of choking on food. Admitted for dysphagia workup.

## 2020-12-15 NOTE — H&P ADULT - PROBLEM SELECTOR PLAN 3
Patient noted to have H/ HH 12.3/38.1 on admission, microcytic anemia  - no obvious signs or symptoms of bleeding  - ferritin, iron, and TIBC ordered. f/u HH 12.3/38.1 on admission, microcytic anemia. appears to be at baseline per chart review  - no obvious signs or symptoms of bleeding  - ferritin, iron, and TIBC ordered. f/u

## 2020-12-15 NOTE — H&P ADULT - NSHPPHYSICALEXAM_GEN_ALL_CORE
T(C): 37.1 (12-15-20 @ 04:00), Max: 37.1 (12-15-20 @ 04:00)  HR: 87 (12-15-20 @ 04:00) (87 - 90)  BP: 147/90 (12-15-20 @ 04:00) (145/92 - 147/90)  RR: 16 (12-15-20 @ 04:00) (16 - 18)  SpO2: 96% (12-15-20 @ 04:00) (96% - 96%)    GENERAL: patient appears well, no acute distress, appropriately interactive  EYES: sclera clear, no exudates  ENMT: oropharynx clear without erythema, no exudates, moist mucous membranes  NECK: supple, soft  LUNGS: good air entry bilaterally, clear to auscultation, symmetric breath sounds, no wheezing or rhonchi appreciated  HEART: soft S1/S2, regular rate and rhythm, no murmurs noted, no lower extremity edema  GASTROINTESTINAL: abdomen is soft, nontender, nondistended, normoactive bowel sounds  INTEGUMENT: good skin turgor, warm skin, appears well perfused  MUSCULOSKELETAL: no clubbing or cyanosis, no obvious deformity  NEUROLOGIC: awake, alert, oriented x3, good muscle tone in all 4 extremities  HEME/LYMPH: no obvious ecchymosis or petechiae

## 2020-12-15 NOTE — ED ADULT NURSE REASSESSMENT NOTE - NS ED NURSE REASSESS COMMENT FT1
Pt is being evaluated with speech and swallow and recommending mechanical soft
pt reavaluated and  medicated as ordered andadmitted to floor awaiting bed assignment vital signs stable
Received pt alert with brother at bedside. Pt is able to move all extremities. Pt denies chest pain and SOB and abd pain. Pt is having an unproductive cough seems like he is trying to clear his throat but there is nothing to clear. Safety/Comfort maintained. Call bell within reach. Freq rounding performed. Pt is resting comfortable in bed.

## 2020-12-15 NOTE — H&P ADULT - PROBLEM SELECTOR PLAN 1
Patient complaining of multiple episodes of choking earlier today, with associated cough.   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 No evidence of aspiration pneumonitis on CT chest   - Keep patient NPO except meds   - Patient passed bedside speech swallow?   - Speech and swallow eval placed; f/u recommendations   - GI (Dr Arredondo) consulted' f/u recommendations   - ENT?? Patient complaining of multiple episodes of choking earlier today, with associated cough.   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 No evidence of aspiration pneumonitis on CT chest   - Keep patient NPO except meds   - Speech and swallow eval placed; f/u recommendations   - GI (Dr Arredondo) consulted' f/u recommendations   - ENT Consulted f/u recommendations Patient complaining of multiple episodes of choking earlier today, with associated cough.   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 No evidence of aspiration pneumonitis on CT chest   - Keep patient NPO except meds   - Speech and swallow eval placed; f/u recommendations   - GI (Dr Arredondo) consulted' f/u recommendations   - ENT Dr. Soto Consulted f/u recommendations

## 2020-12-15 NOTE — CONSULT NOTE ADULT - PROBLEM SELECTOR RECOMMENDATION 9
44 y/o male with PMHx Schizophrenia presents to the ED with 2 episodes of choking. Admitted for dysphagia workup.   cough reported - pt is a poor historian - spoke with brother - possibly related to choking during PO intake -   SLP eval noted  GI eval noted  ENT eval planned  CT scan neck - chest reviewed -   bronchial thickening likely related to chr cough - and irritation of the airway  tessalon and robitussin PRN - Hycodan PRN for severe cough  planned for Esophagram   on RA - no resp distress -   diff dx of chr cough - GERD - Asthma - Post Nasal Drip - Bronchitis -   cont PPI  work up in progress

## 2020-12-15 NOTE — ED PROVIDER NOTE - OBJECTIVE STATEMENT
brother reports pt has had a cough for the past 6 months however recently pt has been choking on his food often, required the hiemlick maneuver twice today. Pt in no distress in triage.  see chief complaint quote 44 y/o male h/o psych, asthma, his brother reports two episodes of choking on his food and severe distress, the patient appearing cyanotic, then  cleared by the Heimlich maneuver. This happened at two separate meals , when he was eating pasta and later when eating a sandwich.   pt has had a cough for the past 6 months. He is now asymptomatic, no CP, no SOB, no fever, no COVID symptoms, no stroke symptoms 44 y/o male h/o psych, asthma, his brother reports two episodes of choking on his food and severe distress, the patient appearing cyanotic, then  cleared by the Heimlich maneuver. This happened during two  meals , when he was eating pasta and later when eating a sandwich.   pt has had a cough for the past 6 months. He is now asymptomatic, no CP, no SOB, no fever, no COVID symptoms, no stroke symptoms

## 2020-12-15 NOTE — SWALLOW BEDSIDE ASSESSMENT ADULT - ASR SWALLOW ASPIRATION MONITOR
gurgly voice/pneumonia/oral hygiene/throat clearing/upper respiratory infection/change of breathing pattern/position upright (90Y)/cough/fever

## 2020-12-15 NOTE — H&P ADULT - HISTORY OF PRESENT ILLNESS
46 y/o male with PMHx Schizophrenia    In the ED  VS:  Labs: WBC, HH, PLTS, Na, K, Cr, Gluc  Chest Xray:  EKG:   Given in ED: 44 y/o male with PMHx Schizophrenia    In the ED  VS: T: 98.8, HR: 90, BP: 145/92, RR: 16, SpO2: 96% on room air   Labs: WBC: 6.74, Hemoglobin/Hct: 12.3/38.1.  PLTS: 243,  Na, 144 K 3.9, Cr, 1.00 Gluc 87  EKG:   CT chest and neck: 1. No neck mass or lymphadenopathy. 2. Calcification of the distal stylohyoid apparatus bilaterally extending to the hyoid bone (although  from the lesser cornua by noncalcified fibrous tissue), which is of uncertain clinical significance, and may be a normal finding in asymptomatic patients. However, given the clinical history, the possibility of a variant of Eagle's syndrome cannot be excluded. 3. Clear lungs without evidence for aspiration pneumonia or pneumonitis. However, mild diffuse bronchial wall thickening is likely inflammatory related to history of bronchial asthma, and less likely due to an infectious bronchitis.    Given in ED: 1 L NS bolus, Protonix 40 mg IVP x 1 44 y/o male with PMHx Schizophrenia presents to the ED with 2 episodes of choking. Patient is a poor historian. History is taken from brother who is at bedside. Patient was COVID + in April and has been experiencing a chronic dry cough for several months now which has been worked up in [facility] ED twice ( March 2020 and May 2020) and diagnosed as posterior nasal drip. Yesterday, patient was eating pasta around 6pm and suddenly began to choke and gasp for air. He became cyanotic. Brother performed heimlich and cleared food from airway. Patient had a second episode of choking later that evening while eating a sandwich. He became cyanotic again and brother performed heimlich again which cleared food from throat. This has never happened before. Patient does not have issues with swallowing liquids. When asked where does the food get stuck, he points to the middle of his throat. Denies any GERD symptoms, weightloss, hematemesis. No fever, n/v/d, cp, sob.    In the ED  VS: T: 98.8, HR: 90, BP: 145/92, RR: 16, SpO2: 96% on room air   Labs: WBC: 6.74, Hemoglobin/Hct: 12.3/38.1.  PLTS: 243,  Na, 144 K 3.9, Cr, 1.00 Gluc 87  EKG: NSR, nonspecific T wave abnormality in V1-V3  CT chest and neck: 1. No neck mass or lymphadenopathy. 2. Calcification of the distal stylohyoid apparatus bilaterally extending to the hyoid bone (although  from the lesser cornua by noncalcified fibrous tissue), which is of uncertain clinical significance, and may be a normal finding in asymptomatic patients. However, given the clinical history, the possibility of a variant of Eagle's syndrome cannot be excluded. 3. Clear lungs without evidence for aspiration pneumonia or pneumonitis. However, mild diffuse bronchial wall thickening is likely inflammatory related to history of bronchial asthma, and less likely due to an infectious bronchitis.    Given in ED: 1 L NS bolus, Protonix 40 mg IVP x 1 46 y/o male with PMHx Schizophrenia presents to the ED with 2 episodes of choking. Patient is a poor historian. History is taken from brother who is at bedside. Patient was COVID + in April and has been experiencing a chronic dry cough for several months now which has been worked up in [facility] ED twice ( March 2020 and May 2020) and diagnosed as posterior nasal drip. Yesterday, patient was eating pasta around 6pm and suddenly began to choke and gasp for air. He became cyanotic. Brother performed heimlich and cleared food from airway. Patient had a second episode of choking later that evening while eating a sandwich. He became cyanotic again and brother performed heimlich again which cleared food from throat. This has never happened before. Patient does not have issues with swallowing liquids. When asked "where does the food get stuck?" he points to the middle of his throat. Denies any GERD symptoms, weightloss, hematemesis. No fever, n/v/d, cp, sob.    In the ED  VS: T: 98.8, HR: 90, BP: 145/92, RR: 16, SpO2: 96% on room air   Labs: WBC: 6.74, Hemoglobin/Hct: 12.3/38.1.  PLTS: 243,  Na, 144 K 3.9, Cr, 1.00 Gluc 87  EKG: NSR, nonspecific T wave abnormality in V1-V3  CT chest and neck: 1. No neck mass or lymphadenopathy. 2. Calcification of the distal stylohyoid apparatus bilaterally extending to the hyoid bone (although  from the lesser cornua by noncalcified fibrous tissue), which is of uncertain clinical significance, and may be a normal finding in asymptomatic patients. However, given the clinical history, the possibility of a variant of Eagle's syndrome cannot be excluded. 3. Clear lungs without evidence for aspiration pneumonia or pneumonitis. However, mild diffuse bronchial wall thickening is likely inflammatory related to history of bronchial asthma, and less likely due to an infectious bronchitis.    Given in ED: 1 L NS bolus, Protonix 40 mg IVP x 1

## 2020-12-15 NOTE — CONSULT NOTE ADULT - PROBLEM SELECTOR RECOMMENDATION 9
in setting of chronic cough  imaging reviewed  f/u ENT eval  suggest trial of daily ppi  dysphagia diet as per SLP recs  aspiration precautions  monitor diet tolerance   may need to consider egd this admission, will discuss further w pt/brother in setting of chronic cough  imaging reviewed  slp eval noted, ent eval pending  will plan for esophagram in am  dysphagia diet as per SLP recs; npo p mn  aspiration precautions  further gi recs pending above in setting of chronic cough  imaging reviewed  slp eval noted, ent eval pending  will plan for esophagram in am  diet as per SLP recs; npo p mn  further gi recs pending above in setting of chronic cough  imaging reviewed  slp eval noted  ent eval pending  trial of daily ppi  will plan for esophagram in am  diet as per SLP recs, monitor tolerance; npo p mn  further gi recs pending above

## 2020-12-15 NOTE — SWALLOW BEDSIDE ASSESSMENT ADULT - SWALLOW EVAL: DIAGNOSIS
1. The patient demonstrated functional oral management of puree, mechanical soft, regular solids, honey thick liquid, nectar thick liquid and thin liquid textures marked by adequate bolus collection, transfer and posterior transport. 2. The patient demonstrated functional pharyngeal skills for all consistencies trialed marked by suspect timely initiation of swallow trigger with + hyolaryngeal elevation upon digital palpation without evidence of airway penetration. 3. Given chronic cough with subsequent choking episodes on dense solid textures, recommend dysphagia 2 mechanical soft with thin liquids + aspiration precautions.

## 2020-12-15 NOTE — ED PROVIDER NOTE - CLINICAL SUMMARY MEDICAL DECISION MAKING FREE TEXT BOX
multiple episodes of choking and aspiration of food.      plan CT scan admit,   GI consult multiple episodes of choking and aspiration of food.      plan CT scan admit,   GI consult, ENT consult, admit

## 2020-12-15 NOTE — H&P ADULT - NSHPREVIEWOFSYSTEMS_GEN_ALL_CORE
CONSTITUTIONAL: No weakness, fevers or chills  RESPIRATORY: + nonproductive cough, no wheezing or hemoptysis; No shortness of breath  CARDIOVASCULAR: No chest pain or palpitations  GASTROINTESTINAL: No abdominal or epigastric pain. No nausea, vomiting, or hematemesis; No diarrhea or constipation. No melena or hematochezia.  GENITOURINARY: No dysuria, frequency or hematuria  NEUROLOGICAL: No numbness or weakness  SKIN: No itching, burning, rashes, or lesions   All other review of systems is negative unless indicated above.

## 2020-12-15 NOTE — H&P ADULT - ASSESSMENT
44 y/o male with PMHx Schizophrenia presents to the ED with 2 episodes of choking. Admitted for dysphagia workup.

## 2020-12-16 DIAGNOSIS — T17.308A UNSPECIFIED FOREIGN BODY IN LARYNX CAUSING OTHER INJURY, INITIAL ENCOUNTER: ICD-10-CM

## 2020-12-16 DIAGNOSIS — R13.14 DYSPHAGIA, PHARYNGOESOPHAGEAL PHASE: ICD-10-CM

## 2020-12-16 LAB
CRP SERPL-MCNC: 1.34 MG/DL — HIGH (ref 0–0.4)
SARS-COV-2 IGG SERPL QL IA: NEGATIVE — SIGNIFICANT CHANGE UP
SARS-COV-2 IGM SERPL IA-ACNC: 0.26 INDEX — SIGNIFICANT CHANGE UP

## 2020-12-16 PROCEDURE — 99232 SBSQ HOSP IP/OBS MODERATE 35: CPT

## 2020-12-16 PROCEDURE — 74220 X-RAY XM ESOPHAGUS 1CNTRST: CPT | Mod: 26

## 2020-12-16 PROCEDURE — 99233 SBSQ HOSP IP/OBS HIGH 50: CPT

## 2020-12-16 RX ADMIN — Medication 1 SPRAY(S): at 17:18

## 2020-12-16 RX ADMIN — PANTOPRAZOLE SODIUM 40 MILLIGRAM(S): 20 TABLET, DELAYED RELEASE ORAL at 06:28

## 2020-12-16 RX ADMIN — Medication 200 MILLIGRAM(S): at 22:24

## 2020-12-16 RX ADMIN — CLOZAPINE 300 MILLIGRAM(S): 150 TABLET, ORALLY DISINTEGRATING ORAL at 22:24

## 2020-12-16 RX ADMIN — Medication 100 MILLIGRAM(S): at 06:27

## 2020-12-16 RX ADMIN — Medication 1 SPRAY(S): at 06:27

## 2020-12-16 RX ADMIN — DIVALPROEX SODIUM 500 MILLIGRAM(S): 500 TABLET, DELAYED RELEASE ORAL at 06:27

## 2020-12-16 RX ADMIN — DIVALPROEX SODIUM 1500 MILLIGRAM(S): 500 TABLET, DELAYED RELEASE ORAL at 22:25

## 2020-12-16 NOTE — CONSULT NOTE ADULT - SUBJECTIVE AND OBJECTIVE BOX
Chief Complaint:  Patient is a 45y old  Male who presents with a chief complaint of dysphagia (15 Dec 2020 10:36)    Schizophrenia, paranoid    No pertinent past medical history    No significant past surgical history       HPI:  44 y/o male with PMHx Schizophrenia presents to the ED with 2 episodes of choking. Patient is a poor historian. History is taken from brother who is at bedside. Patient was COVID + in April and has been experiencing a chronic dry cough for several months now which has been worked up in [facility] ED twice ( March 2020 and May 2020) and diagnosed as posterior nasal drip. Yesterday, patient was eating pasta around 6pm and suddenly began to choke and gasp for air. He became cyanotic. Brother performed heimlich and cleared food from airway. Patient had a second episode of choking later that evening while eating a sandwich. He became cyanotic again and brother performed heimlich again which cleared food from throat. This has never happened before. Patient does not have issues with swallowing liquids. When asked "where does the food get stuck?" he points to the middle of his throat. Denies any GERD symptoms, weightloss, hematemesis. No fever, n/v/d, cp, sob.    In the ED  VS: T: 98.8, HR: 90, BP: 145/92, RR: 16, SpO2: 96% on room air   Labs: WBC: 6.74, Hemoglobin/Hct: 12.3/38.1.  PLTS: 243,  Na, 144 K 3.9, Cr, 1.00 Gluc 87  EKG: NSR, nonspecific T wave abnormality in V1-V3  CT chest and neck: 1. No neck mass or lymphadenopathy. 2. Calcification of the distal stylohyoid apparatus bilaterally extending to the hyoid bone (although  from the lesser cornua by noncalcified fibrous tissue), which is of uncertain clinical significance, and may be a normal finding in asymptomatic patients. However, given the clinical history, the possibility of a variant of Eagle's syndrome cannot be excluded. 3. Clear lungs without evidence for aspiration pneumonia or pneumonitis. However, mild diffuse bronchial wall thickening is likely inflammatory related to history of bronchial asthma, and less likely due to an infectious bronchitis.    Given in ED: 1 L NS bolus, Protonix 40 mg IVP x 1  (15 Dec 2020 04:45)    gi consulted for choking episode. chart reviewed. pt w multiple prior ed visits for 'coughing.' pt seen and examined earlier this am in ed. brother present and provided most of hx. as per brother, pt has had chronic cough for the past 6 mos. yesterday had episode of choking x 2, once after eating pasta and once after eating a sandwich, pt turned red and was sob, both episodes required brother to perform heimlich in order to dislodge food. states no issue w liquids. upon eval pt sitting up in bed states he feels fine, tolerating oral secretions, states he is hungry and wants to eat. denies f/c, cp/neck pain, dysphagia/odynophagia, n/v/d/c/abd pain. denies prior episodes. has never had egd or colon. no prior abd sx. fhx- father w prostate ca and mother w pancreatic ca; brother also endorse fhx of colon polyps. meds nc. social etoh use, non smoker. discussed possible need of egd pending clinical course however pt refused, although ?unclear regarding pts capacity    recent vs/labs/imaging reviewed- avss on ra  labs reviewed  imaging below      No Known Allergies      cloZAPine 300 milliGRAM(s) Oral <User Schedule>  diVALproex  milliGRAM(s) Oral <User Schedule>  diVALproex DR 1500 milliGRAM(s) Oral at bedtime        FAMILY HISTORY:  Family hx of prostate cancer          Review of Systems:    General:  No wt loss, fevers, chills, night sweats, fatigue  Eyes:  Good vision, no reported pain  ENT:  No sore throat, pain, runny nose, dysphagia  CV:  No pain, palpitations, no lightheadedness  Resp:  sob  GI: see above  :  No pain, bleeding, incontinence, nocturia  Muscle:  No pain, weakness  Neuro:  No weakness, tingling, memory problems  Psych:  No fatigue, insomnia, mood problems, depression  Endocrine:  No polyuria, polydypsia, cold/heat intolerance  Heme:  No petechiae, ecchymosis, easy bruisability  Skin:  No rash, tattoos, scars, edema    Relevant Family History:   n/c    Relevant Social History: n/c      Physical Exam:    Vital Signs:  Vital Signs Last 24 Hrs  T(C): 36.6 (15 Dec 2020 10:54), Max: 37.1 (15 Dec 2020 04:00)  T(F): 97.9 (15 Dec 2020 10:54), Max: 98.7 (15 Dec 2020 04:00)  HR: 82 (15 Dec 2020 10:54) (73 - 90)  BP: 124/79 (15 Dec 2020 10:54) (124/79 - 147/90)  BP(mean): --  RR: 17 (15 Dec 2020 10:54) (16 - 18)  SpO2: 96% (15 Dec 2020 10:54) (96% - 98%)  Daily Height in cm: 170.18 (14 Dec 2020 23:57)    Daily     General: sitting up in bed in nad  HEENT:  NC/AT,  Chest:  resp even non labored on ra no accessory muscle use   Abdomen:  Soft, non-tender, non-distended  Extremities:  no edema  Neuro/Psych:  Awake alert    Laboratory:                            11.8   7.00  )-----------( 224      ( 15 Dec 2020 07:27 )             37.1     12-15    143  |  107  |  20  ----------------------------<  95  4.3   |  26  |  0.94    Ca    8.3<L>      15 Dec 2020 07:27    TPro  7.2  /  Alb  3.2<L>  /  TBili  0.2  /  DBili  x   /  AST  10<L>  /  ALT  24  /  AlkPhos  67  12-15    LIVER FUNCTIONS - ( 15 Dec 2020 07:27 )  Alb: 3.2 g/dL / Pro: 7.2 g/dL / ALK PHOS: 67 U/L / ALT: 24 U/L / AST: 10 U/L / GGT: x           PT/INR - ( 15 Dec 2020 01:12 )   PT: 14.9 sec;   INR: 1.29 ratio         PTT - ( 15 Dec 2020 01:12 )  PTT:45.3 sec      Imaging:      < from: CT Neck Soft Tissue w/ IV Cont (12.15.20 @ 02:41) >    EXAM:  CT NECK SOFT TISSUE IC                          EXAM:  CT CHEST IC                            PROCEDURE DATE:  12/15/2020          INTERPRETATION:  CLINICAL INFORMATION: Choking aspiration, concern for lesion.    COMPARISON: None.    PROCEDURE: Contrast-enhanced CT of the neck and chest was performed after the administration of 90 cc Omnipaque 350 intravenous contrast; 10 cc were discarded. Coronal and sagittal reformats were performed.    FINDINGS:    NECK:    There is calcification of the distal stylohyoid ligaments bilaterally extending to the articulation with the lesser cornu of the hyoid bone.    Otherwise, the nasopharynx, oropharynx, hypopharynx, larynx are unremarkable. The submandibular, parotid and thyroid glands are unremarkable. There is no fluid collection or pathologic cervical lymphadenopathy.    The vessels are within normal limits.    There is polypoid mucosal thickening in the bilateral maxillary sinuses. There is also mucosal thickening in bilateral ethmoid air cells. Impacted cerumen is seen in both external auditory canals.    No acute or aggressive osseous lesions are seen.    CHEST:    The central airways are patent. There is mild diffuse bronchial wall thickening. Otherwise, the lungs are clear. Thereis no pleural effusion.    There is no mediastinal or hilar lymphadenopathy.    The heart size is normal. There is a trace pericardial effusion. The vessels are unremarkable.    There is bilateral gynecomastia.    The visualized upper abdomen is grossly unremarkable.    There are mild degenerative changes in visualized spine.    IMPRESSION:    1. No neck mass or lymphadenopathy.    2. Calcification of the distal stylohyoid apparatus bilaterally extending to the hyoid bone (although  fromthe lesser cornua by noncalcified fibrous tissue), which is of uncertain clinical significance, and may be a normal finding in asymptomatic patients. However, given the clinical history, the possibility of a variant of Eagle's syndrome cannot be excluded.    3. Clear lungs without evidence for aspiration pneumonia or pneumonitis. However, mild diffuse bronchial wall thickening is likely inflammatory related to history of bronchial asthma, and less likely due to an infectious bronchitis.              ERICA VILLALOBOS MD; Attending Radiologist  This document has been electronically signed. Dec 15 2020  3:20AM    < end of copied text >      
Date/Time Patient Seen:  		  Referring MD:   Data Reviewed	       Patient is a 45y old  Male who presents with a chief complaint of dysphagia (15 Dec 2020 12:49)      Subjective/HPI  h and p reviewed  er provider note reviewed  GI eval noted  SLP eval noted  ct scan reviewed  spoke with pt's brother -     Dysphagia 2 mechanical soft with thin liquids  alternate food with liquid; maintain upright posture during/after eating for 30 mins; position upright (90 degrees); small sips/bites  no assistance needed     PAST MEDICAL & SURGICAL HISTORY:  Schizophrenia, paranoid    No pertinent past medical history    No significant past surgical history  44 y/o male with PMHx Schizophrenia presents to the ED with 2 episodes of choking. Patient is a poor historian. History is taken from brother who is at bedside. Patient was COVID + in April and has been experiencing a chronic dry cough for several months now which has been worked up in [facility] ED twice ( March 2020 and May 2020) and diagnosed as posterior nasal drip. Yesterday, patient was eating pasta around 6pm and suddenly began to choke and gasp for air. He became cyanotic. Brother performed heimlich and cleared food from airway. Patient had a second episode of choking later that evening while eating a sandwich. He became cyanotic again and brother performed heimlich again which cleared food from throat. This has never happened before. Patient does not have issues with swallowing liquids. When asked "where does the food get stuck?" he points to the middle of his throat. Denies any GERD symptoms, weightloss, hematemesis. No fever, n/v/d, cp, sob.  PAST MEDICAL HISTORY:  Schizophrenia, paranoid.     PAST SURGICAL HISTORY:  No significant past surgical history.     FAMILY HISTORY:  Family hx of prostate cancer.     Social History:  Social History (marital status, living situation, occupation, tobacco use, alcohol and drug use, and sexual history): Lives at home with older brother who takes care of him. Nonsmoker, denies alcohol/ilicit drug use.     Tobacco Screening:  · Core Measure Site	Yes  · Has the patient used tobacco in the past 30 days?	No    Risk Assessment:    Present on Admission:  Deep Venous Thrombosis	no  Pulmonary Embolus	no     Heart Failure:  Does this patient have a history of or has been diagnosed with heart failure? no.    HIV Screen (per [facility] Department of Health, HIV screening must be offered to every individual between ages 13 and 64)	Offered and patient declined          Medication list         MEDICATIONS  (STANDING):  cloZAPine 300 milliGRAM(s) Oral <User Schedule>  diVALproex  milliGRAM(s) Oral <User Schedule>  diVALproex DR 1500 milliGRAM(s) Oral at bedtime  pantoprazole    Tablet 40 milliGRAM(s) Oral before breakfast    MEDICATIONS  (PRN):         Vitals log        ICU Vital Signs Last 24 Hrs  T(C): 36.6 (15 Dec 2020 10:54), Max: 37.1 (15 Dec 2020 04:00)  T(F): 97.9 (15 Dec 2020 10:54), Max: 98.7 (15 Dec 2020 04:00)  HR: 82 (15 Dec 2020 10:54) (73 - 90)  BP: 124/79 (15 Dec 2020 10:54) (124/79 - 147/90)  BP(mean): --  ABP: --  ABP(mean): --  RR: 17 (15 Dec 2020 10:54) (16 - 18)  SpO2: 96% (15 Dec 2020 10:54) (96% - 98%)           Input and Output:  I&O's Detail      Lab Data                        11.8   7.00  )-----------( 224      ( 15 Dec 2020 07:27 )             37.1     12-15    143  |  107  |  20  ----------------------------<  95  4.3   |  26  |  0.94    Ca    8.3<L>      15 Dec 2020 07:27    TPro  7.2  /  Alb  3.2<L>  /  TBili  0.2  /  DBili  x   /  AST  10<L>  /  ALT  24  /  AlkPhos  67  12-15            Review of Systems	    cough    Objective     Physical Examination  heart s1s2  lung dec bs  abd soft  on room air        Pertinent Lab findings & Imaging      Burton:  NO   Adequate UO     I&O's Detail           Discussed with:     Cultures:	        Radiology      EXAM:  CT NECK SOFT TISSUE IC                          EXAM:  CT CHEST IC                            PROCEDURE DATE:  12/15/2020          INTERPRETATION:  CLINICAL INFORMATION: Choking aspiration, concern for lesion.    COMPARISON: None.    PROCEDURE: Contrast-enhanced CT of the neck and chest was performed after the administration of 90 cc Omnipaque 350 intravenous contrast; 10 cc were discarded. Coronal and sagittal reformats were performed.    FINDINGS:    NECK:    There is calcification of the distal stylohyoid ligaments bilaterally extending to the articulation with the lesser cornu of the hyoid bone.    Otherwise, the nasopharynx, oropharynx, hypopharynx, larynx are unremarkable. The submandibular, parotid and thyroid glands are unremarkable. There is no fluid collection or pathologic cervical lymphadenopathy.    The vessels are within normal limits.    There is polypoid mucosal thickening in the bilateral maxillary sinuses. There is also mucosal thickening in bilateral ethmoid air cells. Impacted cerumen is seen in both external auditory canals.    No acute or aggressive osseous lesions are seen.    CHEST:    The central airways are patent. There is mild diffuse bronchial wall thickening. Otherwise, the lungs are clear. There is no pleural effusion.    There is no mediastinal or hilar lymphadenopathy.    The heart size is normal. There is a trace pericardial effusion. The vessels are unremarkable.    There is bilateral gynecomastia.    The visualized upper abdomen is grossly unremarkable.    There are mild degenerative changes in visualized spine.    IMPRESSION:    1. No neck mass or lymphadenopathy.    2. Calcification of the distal stylohyoid apparatus bilaterally extending to the hyoid bone (although  from the lesser cornua by noncalcified fibrous tissue), which is of uncertain clinical significance, and may be a normal finding in asymptomatic patients. However, given the clinical history, the possibility of a variant of Eagle's syndrome cannot be excluded.    3. Clear lungs without evidence for aspiration pneumonia or pneumonitis. However, mild diffuse bronchial wall thickening is likely inflammatory related to history of bronchial asthma, and less likely due to an infectious bronchitis.              ERICA VILLALOBOS MD; Attending Radiologist  This document has been electronically signed. Dec 15 2020  3:20AM                          
Pt with hx of schizophrenia with multiple   meds. had 2 episodes of choking on food. Previously had a chronic cough/  Choked on turkey sandwich and then on soft pasta.  Stated he turned blue.  Has had an eval by speech and swallowing a barium swallow and a ct scan chest and neck which I reviewed.  Swallowing eval failed to reveal and problems with oral cavity.  Barium swallow normal.  Ct scan showed calcification of bilateral stylohyoid ligaments. No other pathology noted  On exam pt alert and oriented and cooperative. no difficulty with secretions. no coughing or choking. Oral exam shows an asymmetry6 to oral cavity, right kaufman than left but no distinct pathology. nasal deviated septum with allergic enflamed turbinates.  Neck negative, Flexible laryngoscopy nl to level of vocal cords. no pooling, no lesions no growths.  left cord sluggish compared to right with speech but nl with coughing on scope.. nl abduction with slow adduction.  no obvious etiology for dysphagia

## 2020-12-16 NOTE — CONSULT NOTE ADULT - ASSESSMENT
possible eagles syndrome.  dysphagia with asymmetric motion of vocal cords. no obvious etiology on exam or radiologically

## 2020-12-16 NOTE — PROGRESS NOTE ADULT - ASSESSMENT
dysphagia  cough    patient refusing EGD  await for esophagram  cont diet as tolerated  pulm eval appreciated  will follow

## 2020-12-16 NOTE — CONSULT NOTE ADULT - ATTENDING COMMENTS
I reviewed chart, ct scans, esophagram and notes from internist and swallowing therapist, examined pt, performedd flex laryngoscopy and authored this note

## 2020-12-17 LAB
BASOPHILS # BLD AUTO: 0.03 K/UL — SIGNIFICANT CHANGE UP (ref 0–0.2)
BASOPHILS NFR BLD AUTO: 0.4 % — SIGNIFICANT CHANGE UP (ref 0–2)
EOSINOPHIL # BLD AUTO: 0.08 K/UL — SIGNIFICANT CHANGE UP (ref 0–0.5)
EOSINOPHIL NFR BLD AUTO: 1.2 % — SIGNIFICANT CHANGE UP (ref 0–6)
HCT VFR BLD CALC: 39.1 % — SIGNIFICANT CHANGE UP (ref 39–50)
HGB BLD-MCNC: 12.8 G/DL — LOW (ref 13–17)
IGE SERPL-ACNC: 3 KU/L — SIGNIFICANT CHANGE UP
IMM GRANULOCYTES NFR BLD AUTO: 0.3 % — SIGNIFICANT CHANGE UP (ref 0–1.5)
LYMPHOCYTES # BLD AUTO: 1.46 K/UL — SIGNIFICANT CHANGE UP (ref 1–3.3)
LYMPHOCYTES # BLD AUTO: 21.2 % — SIGNIFICANT CHANGE UP (ref 13–44)
MCHC RBC-ENTMCNC: 29 PG — SIGNIFICANT CHANGE UP (ref 27–34)
MCHC RBC-ENTMCNC: 32.7 GM/DL — SIGNIFICANT CHANGE UP (ref 32–36)
MCV RBC AUTO: 88.7 FL — SIGNIFICANT CHANGE UP (ref 80–100)
MONOCYTES # BLD AUTO: 0.61 K/UL — SIGNIFICANT CHANGE UP (ref 0–0.9)
MONOCYTES NFR BLD AUTO: 8.9 % — SIGNIFICANT CHANGE UP (ref 2–14)
NEUTROPHILS # BLD AUTO: 4.69 K/UL — SIGNIFICANT CHANGE UP (ref 1.8–7.4)
NEUTROPHILS NFR BLD AUTO: 68 % — SIGNIFICANT CHANGE UP (ref 43–77)
NRBC # BLD: 0 /100 WBCS — SIGNIFICANT CHANGE UP (ref 0–0)
PLATELET # BLD AUTO: 226 K/UL — SIGNIFICANT CHANGE UP (ref 150–400)
RBC # BLD: 4.41 M/UL — SIGNIFICANT CHANGE UP (ref 4.2–5.8)
RBC # FLD: 13.3 % — SIGNIFICANT CHANGE UP (ref 10.3–14.5)
WBC # BLD: 6.89 K/UL — SIGNIFICANT CHANGE UP (ref 3.8–10.5)
WBC # FLD AUTO: 6.89 K/UL — SIGNIFICANT CHANGE UP (ref 3.8–10.5)

## 2020-12-17 PROCEDURE — 99232 SBSQ HOSP IP/OBS MODERATE 35: CPT

## 2020-12-17 PROCEDURE — 99221 1ST HOSP IP/OBS SF/LOW 40: CPT

## 2020-12-17 PROCEDURE — 74230 X-RAY XM SWLNG FUNCJ C+: CPT | Mod: 26

## 2020-12-17 RX ORDER — ACETAMINOPHEN 500 MG
650 TABLET ORAL EVERY 6 HOURS
Refills: 0 | Status: DISCONTINUED | OUTPATIENT
Start: 2020-12-17 | End: 2020-12-18

## 2020-12-17 RX ADMIN — Medication 100 MILLIGRAM(S): at 17:26

## 2020-12-17 RX ADMIN — DIVALPROEX SODIUM 1500 MILLIGRAM(S): 500 TABLET, DELAYED RELEASE ORAL at 22:30

## 2020-12-17 RX ADMIN — CLOZAPINE 300 MILLIGRAM(S): 150 TABLET, ORALLY DISINTEGRATING ORAL at 22:29

## 2020-12-17 RX ADMIN — Medication 200 MILLIGRAM(S): at 22:29

## 2020-12-17 RX ADMIN — Medication 1 SPRAY(S): at 17:26

## 2020-12-17 RX ADMIN — PANTOPRAZOLE SODIUM 40 MILLIGRAM(S): 20 TABLET, DELAYED RELEASE ORAL at 06:45

## 2020-12-17 RX ADMIN — Medication 1 SPRAY(S): at 06:45

## 2020-12-17 RX ADMIN — DIVALPROEX SODIUM 500 MILLIGRAM(S): 500 TABLET, DELAYED RELEASE ORAL at 06:45

## 2020-12-17 NOTE — SWALLOW VFSS/MBS ASSESSMENT ADULT - RECOMMENDED FEEDING/EATING TECHNIQUES
allow for swallow between intakes/alternate food with liquid/crush medication (when feasible)/maintain upright posture during/after eating for 30 mins/no straws/oral hygiene/position upright (90 degrees)/provide rest periods between swallows/small sips/bites

## 2020-12-17 NOTE — BEHAVIORAL HEALTH ASSESSMENT NOTE - RISK ASSESSMENT
Low Acute Suicide Risk 44 y/o male with history of schizophrenia, who is admitted status post choking. Patient is future-oriented, has good support, and there is no report of suicidal ideation

## 2020-12-17 NOTE — PROGRESS NOTE ADULT - ASSESSMENT
dysphagia  cough    patient refusing EGD  esophagram normal  cont diet as tolerated  pulm/ent eval appreciated  will follow  dysphagia  cough    patient refusing EGD  esophagram normal  planned for mbs  diet as per speech recs  aspiration precautions  cont daily ppi  pulm/ent eval appreciated  no gi objection to dc planning dysphagia  cough    patient refusing EGD  esophagram normal  planned for mbs  diet as per speech recs  aspiration precautions  dc ppi, no gerd seen on ent scope  pulm/ent eval appreciated  no gi objection to dc planning

## 2020-12-17 NOTE — DIETITIAN INITIAL EVALUATION ADULT. - ORAL INTAKE PTA/DIET HISTORY
Pt reports good appetite and PO intake PTA. Denies following any dietary restrictions PTA. Denies any vitamin/ mineral supplementation PTA. NKFA.

## 2020-12-17 NOTE — DIETITIAN INITIAL EVALUATION ADULT. - ADD RECOMMEND
1) Continue with current Dysphagia 2 mechanical soft with thin liquids as per SLP rx, 2) Pt encouraged adequate PO intake, 3) Monitor pt's PO intake, weight, skin, edema, GI distress

## 2020-12-17 NOTE — DIETITIAN INITIAL EVALUATION ADULT. - FACTORS AFF FOOD INTAKE
Pt seen by SLP on 12/15 recommending Dysphagia 2 mechanical soft with thin liquids. S/P MBS (12/17) recommending to continue with Dysphagia 2 with thin liquids./difficulty swallowing

## 2020-12-17 NOTE — SWALLOW VFSS/MBS ASSESSMENT ADULT - ADDITIONAL RECOMMENDATIONS
This department to continue to follow during this admission, as schedule permits. Continued swallowing therapy is recommended upon discharge from [facility] (rehab vs home care vs outpatient). Outpatient SLP services can be scheduled through the [facility] at [phone].

## 2020-12-17 NOTE — DIETITIAN INITIAL EVALUATION ADULT. - PROBLEM SELECTOR PLAN 1
Patient complaining of multiple episodes of choking earlier today, with associated cough.   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 No evidence of aspiration pneumonitis on CT chest   - Keep patient NPO except meds   - Speech and swallow eval placed; f/u recommendations   - GI (Dr Arredondo) consulted' f/u recommendations   - ENT Dr. Soto Consulted f/u recommendations

## 2020-12-17 NOTE — BEHAVIORAL HEALTH ASSESSMENT NOTE - NSBHCHARTREVIEWINVESTIGATE_PSY_A_CORE FT
< from: 12 Lead ECG (12.15.20 @ 00:44) >    Ventricular Rate 79 BPM    Atrial Rate 79 BPM    P-R Interval 132 ms    QRS Duration 86 ms    Q-T Interval 380 ms    QTC Calculation(Bazett) 435 ms    P Axis 55 degrees    R Axis 37 degrees    T Axis 53 degrees    Diagnosis Line Normal sinus rhythm  Nonspecific T wave abnormality  Abnormal ECG  When compared with ECG of 23-MAY-2020 20:18,  No significant change was found  Confirmed by MERCEDEZ STEPHENSON ([id number]) on 12/15/2020 2:49:55 PM    < end of copied text >

## 2020-12-17 NOTE — SWALLOW VFSS/MBS ASSESSMENT ADULT - DIAGNOSTIC IMPRESSIONS
1. The patient demonstrated functional oral management of puree, nectar thick, and thin liquid textures marked by adequate oral acceptance with adequate bolus collection, transfer, and AP transit time.  2. The patient demonstrated a mild oral dysphagia for solids marked by adequate oral acceptance with prolonged oral manipulation and increased mastication time resulting in delayed bolus collection, transfer, and AP transit time. Trace lingual and palatal residue noted subsequent to deglutition which reduced with a liquid wash.   3. The patient demonstrated a mild pharyngeal dysphagia for puree, solid, nectar thick, and thin liquid textures marked by a delayed pharyngeal swallow trigger (bolus head at the level of the pyriforms for nectar thick and thin liquids) with reduced base of tongue retraction, reduced epiglottic deflection, reduced hyolaryngeal elevation, and reduced pharyngeal contractility resulting in trace stasis along the base of tongue, in the vallecula, along the posterior pharyngeal wall, and in the pyriforms which reduced with a subsequent swallow. No laryngeal penetration/aspiration observed.  3. It should be noted that the patient required intermittent verbal prompting to elicit a pharyngeal swallow trigger throughout today's evaluation. 1. The patient demonstrated functional oral management of puree, nectar thick, and thin liquid textures marked by adequate oral acceptance with adequate bolus collection, transfer, and AP transit time.  2. The patient demonstrated a mild oral dysphagia for solids marked by adequate oral acceptance with prolonged oral manipulation and increased mastication time resulting in delayed bolus collection, transfer, and AP transit time. Trace lingual and palatal residue noted subsequent to deglutition which reduced with a liquid wash.   3. The patient demonstrated a mild pharyngeal dysphagia for puree, solid, nectar thick, and thin liquid textures marked by a delayed pharyngeal swallow trigger (bolus head at the level of the pyriforms for nectar thick and thin liquids) with reduced base of tongue retraction, reduced epiglottic deflection, reduced hyolaryngeal elevation, and reduced pharyngeal contractility resulting in trace stasis along the base of tongue, in the vallecula, along the posterior pharyngeal wall, and in the pyriforms which reduced with a subsequent swallow. No laryngeal penetration/aspiration observed.  4. It should be noted that the patient required intermittent verbal prompting to elicit a pharyngeal swallow trigger throughout today's evaluation.

## 2020-12-17 NOTE — BEHAVIORAL HEALTH ASSESSMENT NOTE - NSBHCHARTREVIEWIMAGING_PSY_A_CORE FT
< from: CT Chest w/ IV Cont (12.15.20 @ 02:42) >    IMPRESSION:    1. No neck mass or lymphadenopathy.    2. Calcification of the distal stylohyoid apparatus bilaterally extending to the hyoid bone (although  fromthe lesser cornua by noncalcified fibrous tissue), which is of uncertain clinical significance, and may be a normal finding in asymptomatic patients. However, given the clinical history, the possibility of a variant of Eagle's syndrome cannot be excluded.    3. Clear lungs without evidence for aspiration pneumonia or pneumonitis. However, mild diffuse bronchial wall thickening is likely inflammatory related to history of bronchial asthma, and less likely due to an infectious bronchitis.              ERICA VILLALOBOS MD; Attending Radiologist  This document has been electronically signed. Dec 15 2020  3:20AM    < end of copied text >

## 2020-12-17 NOTE — BEHAVIORAL HEALTH ASSESSMENT NOTE - HPI (INCLUDE ILLNESS QUALITY, SEVERITY, DURATION, TIMING, CONTEXT, MODIFYING FACTORS, ASSOCIATED SIGNS AND SYMPTOMS)
Patient seen, evaluated and chart reviewed. Patient is a 46 y/o [place] male, domiciled with his brother, disabled, with mulitple prior psychiatric hospitalizations, history of Schizophrenia presents to the ED with 2 episodes of choking. Patient is a poor historian. History is taken from brother who is at bedside. Patient was COVID + in April and has been experiencing a chronic dry cough for several months now which has been worked up in [facility] ED twice ( March 2020 and May 2020) and diagnosed as posterior nasal drip. Yesterday, patient was eating pasta around 6pm and suddenly began to choke and gasp for air. He became cyanotic. Brother performed heimlich and cleared food from airway. Patient had a second episode of choking later that evening while eating a sandwich. He became cyanotic again and brother performed heimlich again which cleared food from throat. This has never happened before. Patient does not have issues with swallowing liquids. When asked "where does the food get stuck?" he points to the middle of his throat. Denies any GERD symptoms, weight loss, hematemesis. No fever, n/v/d, cp, sob. Patient currently presents with stable mood, and has no complaints. No evidence of acute psychosis, earnest or depression.

## 2020-12-17 NOTE — SWALLOW VFSS/MBS ASSESSMENT ADULT - COMMENTS
The patient was received in the radiology suite this AM for a Modified Barium Swallow Study, at which time he was alert and cooperative. Patient able to follow directives throughout today's evaluation. The patient is known to this department as he was initially seen for a bedside swallow evaluation on 12/15/20 (please see full report for details), at which time a mechanical soft solid with thin liquid diet was recommended.    Per charting, the patient is a "45-year-old man with PMH of Schizophrenia who presented to the ED with two episodes of choking on food. Admitted for dysphagia workup."    Per further chart review, the patient was seen by ENT who reported, "Previously had a chronic cough/  Choked on turkey sandwich and then on soft pasta.  Stated he turned blue.  Has had an eval by speech and swallowing a barium swallow and a ct scan chest and neck which I reviewed.  Swallowing eval failed to reveal and problems with oral cavity.  Barium swallow normal.  Ct scan showed calcification of bilateral stylohyoid ligaments. No other pathology noted. On exam pt alert and oriented and cooperative. no difficulty with secretions. no coughing or choking. Oral exam shows an asymmetry6 to oral cavity, right kaufman than left but no distinct pathology. nasal deviated septum with allergic enflamed turbinates.  Neck negative, Flexible laryngoscopy nl to level of vocal cords. no pooling, no lesions no growths.  left cord sluggish compared to right with speech but nl with coughing on scope.. nl abduction with slow adduction. no obvious etiology for dysphagia."    Discussed results and recommendations from this evaluation with the patient and Dr. Ceron via phone.

## 2020-12-17 NOTE — SWALLOW VFSS/MBS ASSESSMENT ADULT - RECOMMENDED CONSISTENCY
1. Mechanical soft solids with thin liquids, as tolerated  2. Small bites and small, single cup sips  3. Chew mechanical soft solids well   4. Ensure upright positioning during all PO intake  5. Slow pacing during meals and minimize distractions while eating/drinking  6. Maintain aspiration precautions  7. Maintain strict oral care

## 2020-12-17 NOTE — DIETITIAN INITIAL EVALUATION ADULT. - OTHER INFO
As per chart pt is a 45 year old male with a PMH of Schizophrenia who presented to the ED with two episodes of choking on food. Admitted for dysphagia workup. Pt refusing EGD, s/p esopharam which was normal.    Pt seen at bedside. Pt reports currently good appetite and PO intake, per chart noted to be consuming about 100% of meals in house. No admission weight listed in chart. Pt reports current weight of 191lbs. Pt's weight unable to explain full weight hx, states that he weighed 207lbs (unable to state time frame), then stated that he weighed 187lbs and gained weighed to 191lbs (again unable to provide time frame). Pt appears appropriately nourished. Denies any nausea/ vomiting/ diarrhea/ constipation, no BM noted since admission.    No pressure injuries noted at this time.

## 2020-12-17 NOTE — BEHAVIORAL HEALTH ASSESSMENT NOTE - NSBHCHARTREVIEWVS_PSY_A_CORE FT
Vital Signs Last 24 Hrs  T(C): 36.7 (17 Dec 2020 05:18), Max: 37.2 (16 Dec 2020 20:39)  T(F): 98 (17 Dec 2020 05:18), Max: 99 (16 Dec 2020 20:39)  HR: 79 (17 Dec 2020 05:18) (78 - 89)  BP: 117/73 (17 Dec 2020 05:18) (117/73 - 138/87)  BP(mean): --  RR: 18 (17 Dec 2020 05:18) (18 - 18)  SpO2: 95% (17 Dec 2020 05:18) (95% - 96%)

## 2020-12-17 NOTE — SWALLOW VFSS/MBS ASSESSMENT ADULT - FEEDING ASSISTANCE
Patient would benefit from assistance during all meals to ensure safe swallowing precautions are enforced to minimize aspiration/choking risk./frequent cues/help required

## 2020-12-17 NOTE — BEHAVIORAL HEALTH ASSESSMENT NOTE - SUMMARY
44 y/o [place] male, domiciled with his brother, disabled, with mulitple prior psychiatric hospitalizations, history of Schizophrenia presents to the ED with 2 episodes of choking. Patient is a poor historian. History is taken from brother who is at bedside. Patient was COVID + in April and has been experiencing a chronic dry cough for several months now which has been worked up in [facility] ED twice ( March 2020 and May 2020) and diagnosed as posterior nasal drip. Yesterday, patient was eating pasta around 6pm and suddenly began to choke and gasp for air. He became cyanotic. Patient currently presents with stable mood, and has no complaints. No evidence of acute psychosis, earnest or depression.    IMP: Schizophrenia    REC: Continue current treatment

## 2020-12-17 NOTE — DIETITIAN INITIAL EVALUATION ADULT. - PROBLEM SELECTOR PLAN 3
HH 12.3/38.1 on admission, microcytic anemia. appears to be at baseline per chart review  - no obvious signs or symptoms of bleeding  - ferritin, iron, and TIBC ordered. f/u

## 2020-12-18 ENCOUNTER — TRANSCRIPTION ENCOUNTER (OUTPATIENT)
Age: 45
End: 2020-12-18

## 2020-12-18 VITALS
OXYGEN SATURATION: 98 % | HEART RATE: 100 BPM | DIASTOLIC BLOOD PRESSURE: 85 MMHG | SYSTOLIC BLOOD PRESSURE: 150 MMHG | RESPIRATION RATE: 18 BRPM | TEMPERATURE: 98 F

## 2020-12-18 PROCEDURE — 99239 HOSP IP/OBS DSCHRG MGMT >30: CPT

## 2020-12-18 RX ORDER — SODIUM CHLORIDE 0.65 %
2 AEROSOL, SPRAY (ML) NASAL
Qty: 0 | Refills: 0 | DISCHARGE
Start: 2020-12-18

## 2020-12-18 RX ORDER — DIVALPROEX SODIUM 500 MG/1
1 TABLET, DELAYED RELEASE ORAL
Qty: 0 | Refills: 0 | DISCHARGE

## 2020-12-18 RX ORDER — CLOZAPINE 150 MG/1
2.5 TABLET, ORALLY DISINTEGRATING ORAL
Qty: 0 | Refills: 0 | DISCHARGE
Start: 2020-12-18

## 2020-12-18 RX ORDER — CLOZAPINE 150 MG/1
3 TABLET, ORALLY DISINTEGRATING ORAL
Qty: 0 | Refills: 0 | DISCHARGE
Start: 2020-12-18

## 2020-12-18 RX ADMIN — Medication 200 MILLIGRAM(S): at 06:52

## 2020-12-18 RX ADMIN — Medication 1 SPRAY(S): at 06:51

## 2020-12-18 RX ADMIN — DIVALPROEX SODIUM 500 MILLIGRAM(S): 500 TABLET, DELAYED RELEASE ORAL at 06:51

## 2020-12-18 NOTE — PROGRESS NOTE ADULT - PROVIDER SPECIALTY LIST ADULT
Gastroenterology
Internal Medicine
Internal Medicine
Pulmonology
Pulmonology
Gastroenterology
Internal Medicine
Gastroenterology
Pulmonology
Internal Medicine

## 2020-12-18 NOTE — DISCHARGE NOTE PROVIDER - CARE PROVIDER_API CALL
Erick Saenz AND IGNACIO AMIN [facility] [place] [address]  Phone: [phone]  Fax: [phone]  Follow Up Time:

## 2020-12-18 NOTE — PROGRESS NOTE ADULT - PROBLEM SELECTOR PLAN 2
Continue Clozapine.   Continue Depakote.
Continue Clozapine.   Continue Depakote.  Also takes monthly Haldol injection.  Concern that patient may have dysphagia as side effect of psych medications.  Will ask psych to see here.
Cough with bronchial wall thickening on chest CT.  The bronchial wall thickening is non specific and may be due to the chronic cough and not due to a primary pulmonary etiology.  No penetration or aspiration seen on MBS, but Speech Pathology and ENT raise concern for aspiration if patient eating too quickly.  Plan as above.  Per ENT, no evidence of GERD seen on laryngoscopic exam so PPI discontinued.  Continue empiric saline nasal spray for possible post-nasal drip as a cause of the cough.  Case d/w Dr. Dougherty.
Cough with bronchial wall thickening on chest CT.  The bronchial wall thickening is non specific and may be due to the chronic cough and not due to a primary pulmonary etiology.  No penetration or aspiration seen on MBS, but Speech Pathology and ENT raise concern for aspiration if patient eating too quickly.  Plan as above.  Per Dr. Soto, no evidence of GERD seen on laryngoscopic exam so would favor discontinuing PPI, but will discuss with GI.  Would continue empiric saline nasal spray for possible post-nasal drip as a cause of the cough.  Case d/w Dr. Dougherty.

## 2020-12-18 NOTE — PROGRESS NOTE ADULT - SUBJECTIVE AND OBJECTIVE BOX
Date/Time Patient Seen:  		  Referring MD:   Data Reviewed	       Patient is a 45y old  Male who presents with a chief complaint of dysphagia (16 Dec 2020 16:56)      Subjective/HPI     PAST MEDICAL & SURGICAL HISTORY:  Schizophrenia, paranoid    No pertinent past medical history    No significant past surgical history          Medication list         MEDICATIONS  (STANDING):  cloZAPine 300 milliGRAM(s) Oral at bedtime  diVALproex  milliGRAM(s) Oral <User Schedule>  diVALproex DR 1500 milliGRAM(s) Oral at bedtime  pantoprazole    Tablet 40 milliGRAM(s) Oral before breakfast  sodium chloride 0.65% Nasal 1 Spray(s) Both Nostrils two times a day    MEDICATIONS  (PRN):  benzonatate 100 milliGRAM(s) Oral every 8 hours PRN Cough  guaiFENesin   Syrup  (Sugar-Free) 200 milliGRAM(s) Oral every 6 hours PRN Cough  hydrocodone/homatropine Syrup 5 milliLiter(s) Oral at bedtime PRN for severe cough         Vitals log        ICU Vital Signs Last 24 Hrs  T(C): 36.7 (17 Dec 2020 05:18), Max: 37.2 (16 Dec 2020 20:39)  T(F): 98 (17 Dec 2020 05:18), Max: 99 (16 Dec 2020 20:39)  HR: 79 (17 Dec 2020 05:18) (78 - 89)  BP: 117/73 (17 Dec 2020 05:18) (117/73 - 138/87)  BP(mean): --  ABP: --  ABP(mean): --  RR: 18 (17 Dec 2020 05:18) (18 - 18)  SpO2: 95% (17 Dec 2020 05:18) (95% - 96%)           Input and Output:  I&O's Detail      Lab Data                        11.8   7.00  )-----------( 224      ( 15 Dec 2020 07:27 )             37.1     12-15    143  |  107  |  20  ----------------------------<  95  4.3   |  26  |  0.94    Ca    8.3<L>      15 Dec 2020 07:27    TPro  7.2  /  Alb  3.2<L>  /  TBili  0.2  /  DBili  x   /  AST  10<L>  /  ALT  24  /  AlkPhos  67  12-15            Review of Systems	      Objective     Physical Examination    heart s1s2  lung dc BS  abd soft      Pertinent Lab findings & Imaging      Burton:  NO   Adequate UO     I&O's Detail           Discussed with:     Cultures:	        Radiology                            
INTERVAL HPI/OVERNIGHT EVENTS:  pt seen and examined  resting in bed  sp mbs yesterday, tolerating diet  no overnight events per nursing  no new labs    MEDICATIONS  (STANDING):  cloZAPine 300 milliGRAM(s) Oral at bedtime  diVALproex  milliGRAM(s) Oral <User Schedule>  diVALproex DR 1500 milliGRAM(s) Oral at bedtime  sodium chloride 0.65% Nasal 1 Spray(s) Both Nostrils two times a day    MEDICATIONS  (PRN):  acetaminophen   Tablet .. 650 milliGRAM(s) Oral every 6 hours PRN Mild Pain (1 - 3)  benzonatate 100 milliGRAM(s) Oral every 8 hours PRN Cough  guaiFENesin   Syrup  (Sugar-Free) 200 milliGRAM(s) Oral every 6 hours PRN Cough      Allergies    No Known Allergies    Intolerances        Review of Systems:    General:  No wt loss, fevers, chills, night sweats, fatigue   Eyes:  Good vision, no reported pain  ENT:  No sore throat, pain, runny nose  CV:  No pain, palpitations, hypo/hypertension  Resp:  No dyspnea, cough, tachypnea, wheezing  GI:  No pain, No nausea, No vomiting, No diarrhea, No constipation, No weight loss, No fever, No pruritis, No rectal bleeding, No melena  :  No pain, bleeding, incontinence, nocturia  Muscle:  No pain, weakness  Neuro:  No weakness, tingling, memory problems  Psych:  No fatigue, insomnia, mood problems, depression  Endocrine:  No polyuria, polydypsia, cold/heat intolerance  Heme:  No petechiae, ecchymosis, easy bruisability  Skin:  No rash, tattoos, scars, edema      Vital Signs Last 24 Hrs  T(C): 36.8 (18 Dec 2020 04:59), Max: 36.8 (17 Dec 2020 20:20)  T(F): 98.3 (18 Dec 2020 04:59), Max: 98.3 (18 Dec 2020 04:59)  HR: 81 (18 Dec 2020 04:59) (81 - 85)  BP: 137/71 (18 Dec 2020 04:59) (125/76 - 137/71)  BP(mean): --  RR: 18 (18 Dec 2020 04:59) (16 - 18)  SpO2: 93% (18 Dec 2020 04:59) (92% - 97%)    PHYSICAL EXAM:    General: lying in bed  HEENT:  NC/AT,  Abdomen:  Soft, non-tender, non-distended  Extremities:  no edema  Neuro/Psych:  Awake alert appropriate      LABS:                        12.8   6.89  )-----------( 226      ( 17 Dec 2020 14:48 )             39.1                 RADIOLOGY & ADDITIONAL TESTS:  
    INTERVAL HPI/OVERNIGHT EVENTS:  pt seen and examined  ambulating up and down the halls  no overnight events per nursing  seen by ent    MEDICATIONS  (STANDING):  cloZAPine 300 milliGRAM(s) Oral at bedtime  diVALproex  milliGRAM(s) Oral <User Schedule>  diVALproex DR 1500 milliGRAM(s) Oral at bedtime  pantoprazole    Tablet 40 milliGRAM(s) Oral before breakfast  sodium chloride 0.65% Nasal 1 Spray(s) Both Nostrils two times a day    MEDICATIONS  (PRN):  benzonatate 100 milliGRAM(s) Oral every 8 hours PRN Cough  guaiFENesin   Syrup  (Sugar-Free) 200 milliGRAM(s) Oral every 6 hours PRN Cough  hydrocodone/homatropine Syrup 5 milliLiter(s) Oral at bedtime PRN for severe cough      Allergies    No Known Allergies    Intolerances        ROS:   General:  No wt loss, fevers, chills, night sweats, fatigue,   Eyes:  Good vision, no reported pain  ENT:  No sore throat, pain, runny nose,   CV:  No pain, palpitations, hypo/hypertension  Resp:  No dyspnea, + cough, tachypnea, wheezing  GI:  No pain, No nausea, No vomiting, No diarrhea, No constipation, No weight loss, No fever, No pruritis, No rectal bleeding, No tarry stools,  :  No pain, bleeding, incontinence, nocturia  Muscle:  No pain, weakness  Neuro:  No weakness, tingling, memory problems  Psych:  No fatigue, insomnia, mood problems, depression  Endocrine:  No polyuria, polydipsia, cold/heat intolerance  Heme:  No petechiae, ecchymosis, easy bruisability  Skin:  No rash, tattoos, scars, edema      PHYSICAL EXAM:   Vital Signs:  Vital Signs Last 24 Hrs  T(C): 37 (16 Dec 2020 05:26), Max: 37 (15 Dec 2020 18:18)  T(F): 98.6 (16 Dec 2020 05:26), Max: 98.6 (15 Dec 2020 18:18)  HR: 83 (16 Dec 2020 05:26) (75 - 83)  BP: 123/74 (16 Dec 2020 05:26) (123/74 - 142/92)  BP(mean): --  RR: 18 (16 Dec 2020 05:26) (16 - 18)  SpO2: 96% (16 Dec 2020 05:26) (96% - 98%)  Daily     Daily       General: in nad  HEENT:  NC/AT,  Abdomen:  Soft, non-tender, non-distended  Extremities:  no edema  Neuro/Psych:  Awake alert      LABS:                        11.8   7.00  )-----------( 224      ( 15 Dec 2020 07:27 )             37.1     12-15    143  |  107  |  20  ----------------------------<  95  4.3   |  26  |  0.94    Ca    8.3<L>      15 Dec 2020 07:27    TPro  7.2  /  Alb  3.2<L>  /  TBili  0.2  /  DBili  x   /  AST  10<L>  /  ALT  24  /  AlkPhos  67  12-15    PT/INR - ( 15 Dec 2020 01:12 )   PT: 14.9 sec;   INR: 1.29 ratio         PTT - ( 15 Dec 2020 01:12 )  PTT:45.3 sec      RADIOLOGY & ADDITIONAL TESTS:  
Date/Time Patient Seen:  		  Referring MD:   Data Reviewed	       Patient is a 45y old  Male who presents with a chief complaint of dysphagia (15 Dec 2020 16:38)      Subjective/HPI     PAST MEDICAL & SURGICAL HISTORY:  Schizophrenia, paranoid    No pertinent past medical history    No significant past surgical history          Medication list         MEDICATIONS  (STANDING):  cloZAPine 300 milliGRAM(s) Oral at bedtime  diVALproex  milliGRAM(s) Oral <User Schedule>  diVALproex DR 1500 milliGRAM(s) Oral at bedtime  pantoprazole    Tablet 40 milliGRAM(s) Oral before breakfast  sodium chloride 0.65% Nasal 1 Spray(s) Both Nostrils two times a day    MEDICATIONS  (PRN):  benzonatate 100 milliGRAM(s) Oral every 8 hours PRN Cough  guaiFENesin   Syrup  (Sugar-Free) 200 milliGRAM(s) Oral every 6 hours PRN Cough  hydrocodone/homatropine Syrup 5 milliLiter(s) Oral at bedtime PRN for severe cough         Vitals log        ICU Vital Signs Last 24 Hrs  T(C): 37 (16 Dec 2020 05:26), Max: 37 (15 Dec 2020 18:18)  T(F): 98.6 (16 Dec 2020 05:26), Max: 98.6 (15 Dec 2020 18:18)  HR: 83 (16 Dec 2020 05:26) (73 - 83)  BP: 123/74 (16 Dec 2020 05:26) (123/74 - 142/92)  BP(mean): --  ABP: --  ABP(mean): --  RR: 18 (16 Dec 2020 05:26) (16 - 18)  SpO2: 96% (16 Dec 2020 05:26) (96% - 98%)           Input and Output:  I&O's Detail      Lab Data                        11.8   7.00  )-----------( 224      ( 15 Dec 2020 07:27 )             37.1     12-15    143  |  107  |  20  ----------------------------<  95  4.3   |  26  |  0.94    Ca    8.3<L>      15 Dec 2020 07:27    TPro  7.2  /  Alb  3.2<L>  /  TBili  0.2  /  DBili  x   /  AST  10<L>  /  ALT  24  /  AlkPhos  67  12-15            Review of Systems	      Objective     Physical Examination    heart s1s2  lung dc BS  abd soft      Pertinent Lab findings & Imaging      Burton:  NO   Adequate UO     I&O's Detail           Discussed with:     Cultures:	        Radiology                            
Raúl Torres MD  [phone]    SUBJECTIVE:  Resting in bed.  AAOx3.  Denies any difficulty with dinner last night.  No SOB on RA.  No N/V.  NAD.    MEDICATIONS  (STANDING):  cloZAPine 300 milliGRAM(s) Oral at bedtime  diVALproex  milliGRAM(s) Oral <User Schedule>  diVALproex DR 1500 milliGRAM(s) Oral at bedtime  pantoprazole    Tablet 40 milliGRAM(s) Oral before breakfast  sodium chloride 0.65% Nasal 1 Spray(s) Both Nostrils two times a day    MEDICATIONS  (PRN):  benzonatate 100 milliGRAM(s) Oral every 8 hours PRN Cough  guaiFENesin   Syrup  (Sugar-Free) 200 milliGRAM(s) Oral every 6 hours PRN Cough  hydrocodone/homatropine Syrup 5 milliLiter(s) Oral at bedtime PRN for severe cough      Vital Signs Last 24 Hrs  T(C): 37 (16 Dec 2020 05:26), Max: 37 (15 Dec 2020 18:18)  T(F): 98.6 (16 Dec 2020 05:26), Max: 98.6 (15 Dec 2020 18:18)  HR: 83 (16 Dec 2020 05:26) (75 - 83)  BP: 123/74 (16 Dec 2020 05:26) (123/74 - 142/92)  BP(mean): --  RR: 18 (16 Dec 2020 05:26) (16 - 18)  SpO2: 96% (16 Dec 2020 05:26) (96% - 98%)    CAPILLARY BLOOD GLUCOSE        I&O's Summary      PHYSICAL EXAM:  GENERAL: Looks stated age, NAD  CARDIOVASCULAR: Normal S1, S2  PULMONARY: Lungs clear to auscultation bilaterally. No wheezes/rales/rhonchi  GI: Abdomen non-distended, soft, Nontender.  Bowel sounds present  MSK/Ext:  No leg edema.  No calf tenderness bilaterally  PSYCH: Slightly flat affect. AAOx3      LABS:                        11.8   7.00  )-----------( 224      ( 15 Dec 2020 07:27 )             37.1     12-15    143  |  107  |  20  ----------------------------<  95  4.3   |  26  |  0.94    Ca    8.3<L>      15 Dec 2020 07:27    TPro  7.2  /  Alb  3.2<L>  /  TBili  0.2  /  DBili  x   /  AST  10<L>  /  ALT  24  /  AlkPhos  67  12-15    PT/INR - ( 15 Dec 2020 01:12 )   PT: 14.9 sec;   INR: 1.29 ratio         PTT - ( 15 Dec 2020 01:12 )  PTT:45.3 sec    COVID-19  Antibody - for prior infection screening (12.15.20 @ 16:21)    COVID-19 IgG Antibody Index: 0.26: [employer] CMIA  Negative Result    <= 1.39 Index  Positive Result      >= 1.40 Index Index    COVID-19 IgG Antibody Interpretation: Negative: This test has not been reviewed by the FDA by the standard review  process. It has been authorized for emergency use by the FDA. [employer] has validated this test to be accurate.  Negative results do not rule out SARS-CoV-2 infection, particularly in  those who have been in recent contact with the virus. Follow-up testing  with a molecular diagnostic test should be considered to rule out  infection in these individuals.  Results from antibody testing should not be used as thesole basis to  diagnose or exclude SARS-CoV-2 infection, or to inform infection status.  Positive results may rarely be due to past or present infection with  non-SARS-CoV-2 coronavirus strains, such as coronavirus HKU1, NL63, OC43,  or 229E. [facility], through extensive validation  testing, has confirmed that this risk is minimal with this test.    C-Reactive Protein, Serum (12.16.20 @ 03:30)    C-Reactive Protein, Serum: 1.34 mg/dL [0.00 - 0.40]            RADIOLOGY & ADDITIONAL TESTS:      
Raúl Torres MD  [phone]    SUBJECTIVE:  Patient seen and examined.  H&P from earlier today reviewed.  Ambulating in ED.  Asking to eat.  Denies any pain or SOB.  NAD.    MEDICATIONS  (STANDING):  cloZAPine 300 milliGRAM(s) Oral <User Schedule>  diVALproex  milliGRAM(s) Oral <User Schedule>  diVALproex DR 1500 milliGRAM(s) Oral at bedtime    MEDICATIONS  (PRN):      Vital Signs Last 24 Hrs  T(C): 36.5 (15 Dec 2020 08:25), Max: 37.1 (15 Dec 2020 04:00)  T(F): 97.7 (15 Dec 2020 08:25), Max: 98.7 (15 Dec 2020 04:00)  HR: 73 (15 Dec 2020 08:25) (73 - 90)  BP: 132/87 (15 Dec 2020 08:25) (132/87 - 147/90)  BP(mean): --  RR: 16 (15 Dec 2020 08:25) (16 - 18)  SpO2: 98% (15 Dec 2020 08:25) (96% - 98%)    CAPILLARY BLOOD GLUCOSE        I&O's Summary      PHYSICAL EXAM:  GENERAL: Looks stated age, NAD  CARDIOVASCULAR: Normal S1, S2  PULMONARY: Lungs clear to auscultation bilaterally. No wheezes/rales/rhonchi  GI: Abdomen non-distended, soft, Nontender.  Bowel sounds present  MSK/Ext:  No leg edema.  No calf tenderness bilaterally  PSYCH: Slightly flat affect. AAOx3  NEUROLOGY: Normal gait.    LABS:                        11.8   7.00  )-----------( 224      ( 15 Dec 2020 07:27 )             37.1     12-15    143  |  107  |  20  ----------------------------<  95  4.3   |  26  |  0.94    Ca    8.3<L>      15 Dec 2020 07:27    TPro  7.2  /  Alb  3.2<L>  /  TBili  0.2  /  DBili  x   /  AST  10<L>  /  ALT  24  /  AlkPhos  67  12-15    PT/INR - ( 15 Dec 2020 01:12 )   PT: 14.9 sec;   INR: 1.29 ratio         PTT - ( 15 Dec 2020 01:12 )  PTT:45.3 sec            RADIOLOGY & ADDITIONAL TESTS:      
[facility]  Carloz Mendez PA-C  [address] [place] [address]  [phone]      INTERVAL HPI/OVERNIGHT EVENTS:    patient s/e   no new events     MEDICATIONS  (STANDING):  cloZAPine 300 milliGRAM(s) Oral at bedtime  diVALproex  milliGRAM(s) Oral <User Schedule>  diVALproex DR 1500 milliGRAM(s) Oral at bedtime  pantoprazole    Tablet 40 milliGRAM(s) Oral before breakfast  sodium chloride 0.65% Nasal 1 Spray(s) Both Nostrils two times a day    MEDICATIONS  (PRN):  benzonatate 100 milliGRAM(s) Oral every 8 hours PRN Cough  guaiFENesin   Syrup  (Sugar-Free) 200 milliGRAM(s) Oral every 6 hours PRN Cough  hydrocodone/homatropine Syrup 5 milliLiter(s) Oral at bedtime PRN for severe cough      Allergies    No Known Allergies    Intolerances        ROS:   General:  No wt loss, fevers, chills, night sweats, fatigue,   Eyes:  Good vision, no reported pain  ENT:  No sore throat, pain, runny nose, dysphagia  CV:  No pain, palpitations, hypo/hypertension  Resp:  No dyspnea, ++ cough, tachypnea, wheezing  GI:  No pain, No nausea, No vomiting, No diarrhea, No constipation, No weight loss, No fever, No pruritis, No rectal bleeding, No tarry stools, No dysphagia,  :  No pain, bleeding, incontinence, nocturia  Muscle:  No pain, weakness  Neuro:  No weakness, tingling, memory problems  Psych:  No fatigue, insomnia, mood problems, depression  Endocrine:  No polyuria, polydipsia, cold/heat intolerance  Heme:  No petechiae, ecchymosis, easy bruisability  Skin:  No rash, tattoos, scars, edema      PHYSICAL EXAM:   Vital Signs:  Vital Signs Last 24 Hrs  T(C): 37 (16 Dec 2020 05:26), Max: 37 (15 Dec 2020 18:18)  T(F): 98.6 (16 Dec 2020 05:26), Max: 98.6 (15 Dec 2020 18:18)  HR: 83 (16 Dec 2020 05:26) (75 - 83)  BP: 123/74 (16 Dec 2020 05:26) (123/74 - 142/92)  BP(mean): --  RR: 18 (16 Dec 2020 05:26) (16 - 18)  SpO2: 96% (16 Dec 2020 05:26) (96% - 98%)  Daily     Daily     GENERAL:  Appears stated age, well-groomed, well-nourished, no distress  HEENT:  NC/AT,  conjunctivae clear and pink, no thyromegaly, nodules, adenopathy, no JVD, sclera -anicteric  CHEST:  Full & symmetric excursion, no increased effort, breath sounds clear  HEART:  Regular rhythm, S1, S2, no murmur/rub/S3/S4, no abdominal bruit, no edema  ABDOMEN:  Soft, non-tender, non-distended, normoactive bowel sounds,  no masses ,no hepato-splenomegaly, no signs of chronic liver disease  EXTEREMITIES:  no cyanosis,clubbing or edema  SKIN:  No rash/erythema/ecchymoses/petechiae/wounds/abscess/warm/dry  NEURO:  Alert, oriented, no asterixis, no tremor, no encephalopathy      LABS:                        11.8   7.00  )-----------( 224      ( 15 Dec 2020 07:27 )             37.1     12-15    143  |  107  |  20  ----------------------------<  95  4.3   |  26  |  0.94    Ca    8.3<L>      15 Dec 2020 07:27    TPro  7.2  /  Alb  3.2<L>  /  TBili  0.2  /  DBili  x   /  AST  10<L>  /  ALT  24  /  AlkPhos  67  12-15    PT/INR - ( 15 Dec 2020 01:12 )   PT: 14.9 sec;   INR: 1.29 ratio         PTT - ( 15 Dec 2020 01:12 )  PTT:45.3 sec      RADIOLOGY & ADDITIONAL TESTS:  
Raúl Torres MD  [phone]    SUBJECTIVE:  Ambulating in hallway, examined in room.  Says cough is improved from admission.  Asking when lunch will be served.  No SOB.  NAD.     MEDICATIONS  (STANDING):  cloZAPine 300 milliGRAM(s) Oral at bedtime  diVALproex  milliGRAM(s) Oral <User Schedule>  diVALproex DR 1500 milliGRAM(s) Oral at bedtime  pantoprazole    Tablet 40 milliGRAM(s) Oral before breakfast  sodium chloride 0.65% Nasal 1 Spray(s) Both Nostrils two times a day    MEDICATIONS  (PRN):  benzonatate 100 milliGRAM(s) Oral every 8 hours PRN Cough  guaiFENesin   Syrup  (Sugar-Free) 200 milliGRAM(s) Oral every 6 hours PRN Cough      Vital Signs Last 24 Hrs  T(C): 36.7 (17 Dec 2020 05:18), Max: 37.2 (16 Dec 2020 20:39)  T(F): 98 (17 Dec 2020 05:18), Max: 99 (16 Dec 2020 20:39)  HR: 79 (17 Dec 2020 05:18) (78 - 89)  BP: 117/73 (17 Dec 2020 05:18) (117/73 - 138/87)  BP(mean): --  RR: 18 (17 Dec 2020 05:18) (18 - 18)  SpO2: 95% (17 Dec 2020 05:18) (95% - 96%)    CAPILLARY BLOOD GLUCOSE        I&O's Summary      PHYSICAL EXAM:  GENERAL: Looks stated age, NAD  CARDIOVASCULAR: Normal S1, S2  PULMONARY: Lungs clear to auscultation bilaterally. No wheezes/rales/rhonchi  GI: Abdomen non-distended, soft, Nontender.  Bowel sounds present  MSK/Ext:  No leg edema.  No calf tenderness bilaterally  PSYCH: Slightly flat affect. AAOx3      LABS:                      RADIOLOGY & ADDITIONAL TESTS:      
Date/Time Patient Seen:  		  Referring MD:   Data Reviewed	       Patient is a 45y old  Male who presents with a chief complaint of dysphagia (17 Dec 2020 14:20)      Subjective/HPI     PAST MEDICAL & SURGICAL HISTORY:  Schizophrenia, paranoid    No pertinent past medical history    No significant past surgical history          Medication list         MEDICATIONS  (STANDING):  cloZAPine 300 milliGRAM(s) Oral at bedtime  diVALproex  milliGRAM(s) Oral <User Schedule>  diVALproex DR 1500 milliGRAM(s) Oral at bedtime  sodium chloride 0.65% Nasal 1 Spray(s) Both Nostrils two times a day    MEDICATIONS  (PRN):  acetaminophen   Tablet .. 650 milliGRAM(s) Oral every 6 hours PRN Mild Pain (1 - 3)  benzonatate 100 milliGRAM(s) Oral every 8 hours PRN Cough  guaiFENesin   Syrup  (Sugar-Free) 200 milliGRAM(s) Oral every 6 hours PRN Cough         Vitals log        ICU Vital Signs Last 24 Hrs  T(C): 36.8 (18 Dec 2020 04:59), Max: 36.8 (17 Dec 2020 20:20)  T(F): 98.3 (18 Dec 2020 04:59), Max: 98.3 (18 Dec 2020 04:59)  HR: 81 (18 Dec 2020 04:59) (81 - 85)  BP: 137/71 (18 Dec 2020 04:59) (125/76 - 137/71)  BP(mean): --  ABP: --  ABP(mean): --  RR: 18 (18 Dec 2020 04:59) (16 - 18)  SpO2: 93% (18 Dec 2020 04:59) (92% - 97%)           Input and Output:  I&O's Detail      Lab Data                        12.8   6.89  )-----------( 226      ( 17 Dec 2020 14:48 )             39.1                   Review of Systems	      Objective     Physical Examination    heart s1s2  lung dec BS  abd soft      Pertinent Lab findings & Imaging      Burton:  NO   Adequate UO     I&O's Detail           Discussed with:     Cultures:	        Radiology                            
Raúl Torres MD  [phone]    SUBJECTIVE:  Sleeping in bed.  Coughed when first awoken, but quickly resolved.  Denies any CP/SOB.  No N/V, or abdominal pain.  Pulse ox checked by myself after ambulation, 96% on RA with HR of 98.  NAD.    MEDICATIONS  (STANDING):  cloZAPine 300 milliGRAM(s) Oral at bedtime  diVALproex  milliGRAM(s) Oral <User Schedule>  diVALproex DR 1500 milliGRAM(s) Oral at bedtime  sodium chloride 0.65% Nasal 1 Spray(s) Both Nostrils two times a day    MEDICATIONS  (PRN):  acetaminophen   Tablet .. 650 milliGRAM(s) Oral every 6 hours PRN Mild Pain (1 - 3)  benzonatate 100 milliGRAM(s) Oral every 8 hours PRN Cough  guaiFENesin   Syrup  (Sugar-Free) 200 milliGRAM(s) Oral every 6 hours PRN Cough      Vital Signs Last 24 Hrs  T(C): 36.8 (18 Dec 2020 04:59), Max: 36.8 (17 Dec 2020 20:20)  T(F): 98.3 (18 Dec 2020 04:59), Max: 98.3 (18 Dec 2020 04:59)  HR: 81 (18 Dec 2020 04:59) (81 - 85)  BP: 137/71 (18 Dec 2020 04:59) (125/76 - 137/71)  BP(mean): --  RR: 18 (18 Dec 2020 04:59) (16 - 18)  SpO2: 93% (18 Dec 2020 04:59) (92% - 97%)    CAPILLARY BLOOD GLUCOSE        I&O's Summary      PHYSICAL EXAM:  GENERAL: Looks stated age, NAD  CARDIOVASCULAR: Normal S1, S2  PULMONARY: Lungs clear to auscultation bilaterally. No wheezes/rales/rhonchi  GI: Abdomen non-distended, soft, Nontender.  Bowel sounds present  MSK/Ext:  No leg edema.  No calf tenderness bilaterally  PSYCH: Slightly flat affect. AAOx3  NEUROLOGY: Normal gait    LABS:                        12.8   6.89  )-----------( 226      ( 17 Dec 2020 14:48 )             39.1                       RADIOLOGY & ADDITIONAL TESTS:

## 2020-12-18 NOTE — PROGRESS NOTE ADULT - ATTENDING COMMENTS
Advanced care planning was discussed with patient and family.  Advanced care planning forms were reviewed and discussed.  Risks, benefits and alternatives of gastroenterologic procedures were discussed in detail and all questions were answered.    30 minutes spent.
Case d/w patient's brother, Hari at length yesterday and again today.  Plan for discharge today.  Total time spent 45 minutes.
Advanced care planning was discussed with patient and family.  Advanced care planning forms were reviewed and discussed.  Risks, benefits and alternatives of gastroenterologic procedures were discussed in detail and all questions were answered.    30 minutes spent.
Possible discharge today pending Speech pathology follow-up.  Total time spent 40 minutes thus far.

## 2020-12-18 NOTE — PROGRESS NOTE ADULT - ASSESSMENT
dysphagia  cough    patient refusing EGD  esophagram normal  sp mbs; diet as per speech recs  aspiration precautions  dc ppi, no gerd seen on ent scope  pulm/ent eval appreciated  no gi objection to dc planning

## 2020-12-18 NOTE — DISCHARGE NOTE PROVIDER - NSDCCPCAREPLAN_GEN_ALL_CORE_FT
PRINCIPAL DISCHARGE DIAGNOSIS  Diagnosis: Dysphagia  Assessment and Plan of Treatment: Please sit up when eating, eat slowly, and chew your food thoroughly.  Also, please maintain a soft diet.  Please follow-up with your PCP in 5-7 days.      SECONDARY DISCHARGE DIAGNOSES  Diagnosis: Cough  Assessment and Plan of Treatment: Your cough is likely related to how you eat and swallow food.  Please sit up when eating, eat slowly, chew your food thoroughly, and maintain a soft diet as above.  It is also recommended that you continue to use saline nasal spray to treat for possible post-nasal drip as a cause of the cough.  You can take cough medication as needed, but please follow-up with your primary care doctor if you cough persists.    Diagnosis: Schizophrenia  Assessment and Plan of Treatment: Please take your medications as prescribed and follow-up with Dr. Nolan after discharge.    Diagnosis: Anemia  Assessment and Plan of Treatment: Your lab work shows you to be very mildly anemic.  Please follow-up with your primary care physician for further monitoring and care.

## 2020-12-18 NOTE — DISCHARGE NOTE NURSING/CASE MANAGEMENT/SOCIAL WORK - PATIENT PORTAL LINK FT
You can access the FollowMyHealth Patient Portal offered by [facility] by registering at the following website: [email or web address]. By joining [employer]’s FollowMyHealth portal, you will also be able to view your health information using other applications (apps) compatible with our system.

## 2020-12-18 NOTE — PROGRESS NOTE ADULT - PROBLEM SELECTOR PLAN 4
DVT prophylaxis: SCD's if not ambulating
DVT prophylaxis: SCD's if not ambulating
Mild normocytic anemia.  Iron studies wnl.  Anemia at prior baseline.  Could f/u with PCP.
Mild normocytic anemia.  Iron studies wnl.  Anemia at prior baseline.  Could f/u with PCP.

## 2020-12-18 NOTE — PROGRESS NOTE ADULT - PROBLEM SELECTOR PLAN 3
Continue Clozapine.   Continue Depakote.  Also takes monthly Haldol injection.
Mild normocytic anemia.  Iron studies ordered and pending.
Mild normocytic anemia.  Iron studies wnl.  Anemia at prior baseline.  Could f/u with PCP.
Continue Clozapine.   Continue Depakote.  Also takes monthly Haldol injection.  Case d/w Dr. Robert.

## 2020-12-18 NOTE — DISCHARGE NOTE PROVIDER - NSDCMRMEDTOKEN_GEN_ALL_CORE_FT
benzonatate 100 mg oral capsule: 1 cap(s) orally every 8 hours, As Needed -Cough - for cough   cloZAPine 100 mg oral tablet: 3 tab(s) orally once a day (at bedtime)  Depakote 500 mg oral delayed release tablet: 1 tab(s) orally once a day in the morning  Depakote 500 mg oral delayed release tablet: 3 tab(s) orally once a day (at bedtime)  guaiFENesin 100 mg/5 mL oral liquid: 10 milliliter(s) orally every 4 hours, As Needed - for cough  Haldol Decanoate 100 mg/mL intramuscular solution: One injection monthly (last injection 12/10/20)  sodium chloride 0.65% nasal spray: 2 spray(s) nasal 2 times a day

## 2020-12-18 NOTE — DISCHARGE NOTE PROVIDER - HOSPITAL COURSE
The patient is a 45-year-old man with PMH of Schizophrenia who presented to the ED following two episodes of choking on food.  The patient's brother also reported that the patient coughed a lot, particularly with meals.    The patient was admitted to the medicine service and seen by GI, ENT, and Speech Pathology on consult.  MBS was done and showed a weak and delayed swallow and the patient needed to be reminded at times to chew but no penetration or aspiration seen.  On laryngoscopy, the patient was noted to have asymmetric motion of vocal cords with speaking but not with coughing.    The findings on MBS and laryngoscopy were felt to possibly be due to psychiatric medications but with no penetration or aspiration, the plan was to not adjust the psychiatric medications.  Rather, speech pathology saw the patient in follow-up during lunch.  The patient and his brother, Hari, were given instruction for the patient to eat slowly, fully chew his food, sit up when eating, and to continue with mechanical soft diet.     The patient was also seen by pulmonary regarding the cough.  CT chest showed bronchial wall thickening, but this was felt to be non-specific and likely a result of the chronic cough and not representative of any primary pulmonary pathology that would be causing the cough.    The patient was also empirically placed on saline nasal spray for possible post-nasal drip and PPI for possible GERD, but the PPI was discontinued when no evidence of GERD was seen on laryngoscopy.  The patient is being discharged in stable condition with plans for outpatient PCP follow-up.  Hospital course and discharge plan discussed with PCP, Dr. Saenz.

## 2020-12-18 NOTE — PROGRESS NOTE ADULT - PROBLEM SELECTOR PLAN 1
44 y/o male with PMHx Schizophrenia presents to the ED with 2 episodes of choking. Admitted for dysphagia workup.   cough reported - pt is a poor historian - spoke with brother - possibly related to choking during PO intake -   SLP eval noted - ENT eval noted - GI eval noted  CT scan neck - chest reviewed -   bronchial thickening likely related to chr cough - and irritation of the airway  tessalon and robitussin PRN - Hycodan PRN for severe cough  Esophagram normal   on RA - no resp distress -   diff dx of chr cough - GERD - Asthma - Post Nasal Drip - Bronchitis -   cont PPI  work up in progress.
46 y/o male with PMHx Schizophrenia presents to the ED with 2 episodes of choking. Admitted for dysphagia workup.   cough reported - pt is a poor historian - spoke with brother - possibly related to choking during PO intake -   SLP eval noted - ENT eval noted - GI eval noted  MBS eval reviewed -   CT scan neck - chest reviewed -   bronchial thickening likely related to chr cough - and irritation of the airway  tessalon and robitussin PRN - Hycodan PRN for severe cough  Esophagram normal   on RA - no resp distress -   diff dx of chr cough - GERD - Asthma - Post Nasal Drip - Bronchitis -   cont PPI
NPO for Esophagram - on RA - VS noted -   46 y/o male with PMHx Schizophrenia presents to the ED with 2 episodes of choking. Admitted for dysphagia workup.   cough reported - pt is a poor historian - spoke with brother - possibly related to choking during PO intake -   SLP eval noted  GI eval noted  ENT eval planned  CT scan neck - chest reviewed -   bronchial thickening likely related to chr cough - and irritation of the airway  tessalon and robitussin PRN - Hycodan PRN for severe cough  planned for Esophagram   on RA - no resp distress -   diff dx of chr cough - GERD - Asthma - Post Nasal Drip - Bronchitis -   cont PPI  work up in progress.
Patient complaining of multiple episodes of choking prior to admission with associated cough.  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 No evidence of aspiration pneumonitis on CT chest.  Will order mechanical soft diet per speech pathology recommendations.  Await GI and ENT input.
Case d/w patient's brother, Hari Douglass.  Reports chronic cough.  Unclear from report if patient coughing and that leads to choking on food or if perhaps if patient had dysphagia that is causing the cough.  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 No evidence of aspiration pneumonitis on CT chest but there was bronchial wall thickening.  Case d/w Dr. Dougherty.  The bronchial wall thickening is non specific and may be due to the chronic cough and not due to a primary pulmonary etiology.  Treating empirically with PPI and saline nasal spray for possible underlying GERD or post-nasal drip as causes of cough.  Dysphagia also a concern, and outpatient psychiatrist does report that Clozapine can cause dysphagia.  Continue mechanical soft diet per speech pathology recommendations.  Barium esophagram ordered and pending.  Ordering MBS after discussion with speech pathology.  Await ENT input (consult requested by myself today).
MBS results discussed with speech pathologist.  Patient with weak and delayed swallow, needed to be reminded at times to chew but no penetration or aspiration seen.  Case also discussed with Dr. Soto (ENT).  Noted to have asymmetric motion of vocal cords with speaking but not with coughing.  Case also d/w Dr. Robert (Psychiatry).  Findings on MBS and ENT may be due to psychiatric medications but if there is no penetration or aspiration, would prefer to not adjust the psychiatric medications.  Patient needs to be instructed to eat slowly, fully chew his food and sit up when eating.  Would also continue with mechanical soft diet.  Speech Pathology to f/u with patient during a meal and to give counseling to both patient and his brother, Hari.  If no evidence of aspiration noted during meal, could discharge with outpatient PCP follow-up.  Spoke to ENT regarding CT findings, no symptoms of Eagle Syndrome, no further work-up needed.
MBS showed weak and delayed swallow.  Patient needed to be reminded at times to chew but no penetration or aspiration seen.  Per ENT, patient noted to have asymmetric motion of vocal cords with speaking but not with coughing.  Findings on MBS and laryngoscopy felt to possibly be due to psychiatric medications but with no penetration or aspiration, plan is to not adjust the psychiatric medications.  Patient seen in f/u by speech pathology during lunch.  Patient and brother, Hari, given instruction for patient to eat slowly, fully chew his food, sit up when eating, and to continue with mechanical soft diet.

## 2021-01-18 PROCEDURE — 85730 THROMBOPLASTIN TIME PARTIAL: CPT

## 2021-01-18 PROCEDURE — 70491 CT SOFT TISSUE NECK W/DYE: CPT

## 2021-01-18 PROCEDURE — 71260 CT THORAX DX C+: CPT

## 2021-01-18 PROCEDURE — 83540 ASSAY OF IRON: CPT

## 2021-01-18 PROCEDURE — 85610 PROTHROMBIN TIME: CPT

## 2021-01-18 PROCEDURE — 93005 ELECTROCARDIOGRAM TRACING: CPT

## 2021-01-18 PROCEDURE — 86769 SARS-COV-2 COVID-19 ANTIBODY: CPT

## 2021-01-18 PROCEDURE — 99053 MED SERV 10PM-8AM 24 HR FAC: CPT

## 2021-01-18 PROCEDURE — 92610 EVALUATE SWALLOWING FUNCTION: CPT

## 2021-01-18 PROCEDURE — 80053 COMPREHEN METABOLIC PANEL: CPT

## 2021-01-18 PROCEDURE — 86140 C-REACTIVE PROTEIN: CPT

## 2021-01-18 PROCEDURE — 74230 X-RAY XM SWLNG FUNCJ C+: CPT

## 2021-01-18 PROCEDURE — 83550 IRON BINDING TEST: CPT

## 2021-01-18 PROCEDURE — 92611 MOTION FLUOROSCOPY/SWALLOW: CPT

## 2021-01-18 PROCEDURE — 96374 THER/PROPH/DIAG INJ IV PUSH: CPT

## 2021-01-18 PROCEDURE — 99285 EMERGENCY DEPT VISIT HI MDM: CPT

## 2021-01-18 PROCEDURE — 85025 COMPLETE CBC W/AUTO DIFF WBC: CPT

## 2021-01-18 PROCEDURE — 92526 ORAL FUNCTION THERAPY: CPT

## 2021-01-18 PROCEDURE — 84443 ASSAY THYROID STIM HORMONE: CPT

## 2021-01-18 PROCEDURE — 74220 X-RAY XM ESOPHAGUS 1CNTRST: CPT

## 2021-01-18 PROCEDURE — 70450 CT HEAD/BRAIN W/O DYE: CPT

## 2021-01-18 PROCEDURE — 82728 ASSAY OF FERRITIN: CPT

## 2021-01-18 PROCEDURE — 85027 COMPLETE CBC AUTOMATED: CPT

## 2021-01-18 PROCEDURE — 96361 HYDRATE IV INFUSION ADD-ON: CPT

## 2021-01-18 PROCEDURE — 82785 ASSAY OF IGE: CPT

## 2021-01-18 PROCEDURE — 36415 COLL VENOUS BLD VENIPUNCTURE: CPT

## 2021-01-18 PROCEDURE — U0003: CPT

## 2021-01-22 ENCOUNTER — APPOINTMENT (OUTPATIENT)
Dept: UROLOGY | Facility: CLINIC | Age: 46
End: 2021-01-22

## 2021-02-05 ENCOUNTER — LABORATORY RESULT (OUTPATIENT)
Age: 46
End: 2021-02-05

## 2021-02-05 ENCOUNTER — APPOINTMENT (OUTPATIENT)
Dept: UROLOGY | Facility: CLINIC | Age: 46
End: 2021-02-05
Payer: MEDICARE

## 2021-02-05 VITALS
WEIGHT: 192 LBS | HEART RATE: 89 BPM | DIASTOLIC BLOOD PRESSURE: 84 MMHG | OXYGEN SATURATION: 96 % | BODY MASS INDEX: 30.86 KG/M2 | HEIGHT: 66 IN | SYSTOLIC BLOOD PRESSURE: 126 MMHG

## 2021-02-05 DIAGNOSIS — Z20.822 CONTACT WITH AND (SUSPECTED) EXPOSURE TO COVID-19: ICD-10-CM

## 2021-02-05 DIAGNOSIS — Z80.42 FAMILY HISTORY OF MALIGNANT NEOPLASM OF PROSTATE: ICD-10-CM

## 2021-02-05 DIAGNOSIS — Z86.59 PERSONAL HISTORY OF OTHER MENTAL AND BEHAVIORAL DISORDERS: ICD-10-CM

## 2021-02-05 PROCEDURE — 99204 OFFICE O/P NEW MOD 45 MIN: CPT

## 2021-02-05 NOTE — ASSESSMENT
[FreeTextEntry1] : PSA Screening:\par Discussed PSA screening and latest recommendations/guidelines- USPTF and AUA. \par Will get PSA. Will inform results. \par \par Benign Prostatic Hyperplasia:\par No bothersome lower urinary tract symptoms. \par \par COVID test:\par Will get COVID antibody test. Will inform results. \par \par Return to office in 1 year or sooner if any issues- will do Uroflo/PVR.

## 2021-02-05 NOTE — PHYSICAL EXAM
[Normal Appearance] : normal appearance [General Appearance - In No Acute Distress] : no acute distress [FreeTextEntry1] : normal peripheral circulation  [] : no respiratory distress [Abdomen Soft] : soft [Abdomen Tenderness] : non-tender [Costovertebral Angle Tenderness] : no ~M costovertebral angle tenderness [Urethral Meatus] : meatus normal [Penis Abnormality] : normal uncircumcised penis [Scrotum] : the scrotum was normal [Testes Tenderness] : no tenderness of the testes [Epididymis] : the epididymides were normal [Testes Mass (___cm)] : there were no testicular masses [Prostate Tenderness] : the prostate was not tender [No Prostate Nodules] : no prostate nodules [Prostate Size ___ (0-4)] : prostate size [unfilled] (scale: 0-4) [Normal Station and Gait] : the gait and station were normal for the patient's age [Skin Color & Pigmentation] : normal skin color and pigmentation [No Focal Deficits] : no focal deficits [Oriented To Time, Place, And Person] : oriented to person, place, and time

## 2021-02-05 NOTE — LETTER BODY
[Dear  ___] : Dear  [unfilled], [Consult Letter:] : I had the pleasure of evaluating your patient, [unfilled]. [( Thank you for referring [unfilled] for consultation for _____ )] : Thank you for referring [unfilled] for consultation for [unfilled] [Please see my note below.] : Please see my note below. [Consult Closing:] : Thank you very much for allowing me to participate in the care of this patient.  If you have any questions, please do not hesitate to contact me. [Sincerely,] : Sincerely, [FreeTextEntry3] : Kenney Crenshaw MD\par  of Urology\par [employer] [facility]\par \par Offices:\par [facility] @\par [address], [place] [address]\par and\par [address], [place] [address], [address]\par and\par [address] [address]\par \par TEL: [phone]\par FAX: [phone]

## 2021-02-05 NOTE — HISTORY OF PRESENT ILLNESS
[None] : None [FreeTextEntry1] : 44 yo male presents for PSA Screening. \par Accompanied by his brother who is also my Patient.\par Patient has Schizophrenia. History provided by brother. \par Strong family history of Prostate cancer: Father, Paternal GF and uncles. \par Denies any difficulty with urination. Nocturia 1 x. \par Denies dysuria, hematuria, lower abdominal or flank pain, nausea, vomiting, fever, chills or rigors. \par \par Brother will like for him and brother to be tested for COVID antibody, as they have been exposed in the past with minimal symptoms.

## 2021-02-08 ENCOUNTER — NON-APPOINTMENT (OUTPATIENT)
Age: 46
End: 2021-02-08

## 2021-02-08 LAB
APPEARANCE: CLEAR
BILIRUBIN URINE: NEGATIVE
BLOOD URINE: NEGATIVE
COLOR: YELLOW
GLUCOSE QUALITATIVE U: NEGATIVE
KETONES URINE: NEGATIVE
LEUKOCYTE ESTERASE URINE: NEGATIVE
NITRITE URINE: NEGATIVE
PH URINE: 6.5
PROTEIN URINE: ABNORMAL
PSA SERPL-MCNC: 0.34 NG/ML
SARS-COV-2 IGG SERPL IA-ACNC: 4.75 INDEX
SARS-COV-2 IGG SERPL QL IA: POSITIVE
SPECIFIC GRAVITY URINE: 1.03
UROBILINOGEN URINE: NORMAL

## 2021-02-10 NOTE — ED PROVIDER NOTE - CPE EDP NEURO NORM
Pt c/o rt flank that started around 1400 progressively worse. Non radiating. + nausea.  No urinary symptoms.    Migraine with aura and without status migrainosus, not intractable normal...

## 2021-02-22 ENCOUNTER — APPOINTMENT (OUTPATIENT)
Dept: GASTROENTEROLOGY | Facility: CLINIC | Age: 46
End: 2021-02-22
Payer: MEDICARE

## 2021-02-22 VITALS
OXYGEN SATURATION: 98 % | HEIGHT: 66 IN | HEART RATE: 80 BPM | DIASTOLIC BLOOD PRESSURE: 96 MMHG | SYSTOLIC BLOOD PRESSURE: 156 MMHG | WEIGHT: 194 LBS | BODY MASS INDEX: 31.18 KG/M2

## 2021-02-22 DIAGNOSIS — K59.09 OTHER CONSTIPATION: ICD-10-CM

## 2021-02-22 DIAGNOSIS — R13.10 DYSPHAGIA, UNSPECIFIED: ICD-10-CM

## 2021-02-22 PROCEDURE — 99204 OFFICE O/P NEW MOD 45 MIN: CPT

## 2021-02-22 RX ORDER — DIVALPROEX SODIUM 250 MG/1
250 TABLET, DELAYED RELEASE ORAL
Refills: 0 | Status: ACTIVE | COMMUNITY

## 2021-02-22 RX ORDER — CLONAZEPAM 2 MG/1
TABLET ORAL
Refills: 0 | Status: ACTIVE | COMMUNITY

## 2021-02-22 RX ORDER — HALOPERIDOL 5 MG/ML
INJECTION INTRAMUSCULAR
Refills: 0 | Status: ACTIVE | COMMUNITY

## 2021-02-22 NOTE — PHYSICAL EXAM
[General Appearance - Alert] : alert [General Appearance - In No Acute Distress] : in no acute distress [Sclera] : the sclera and conjunctiva were normal [Extraocular Movements] : extraocular movements were intact [Outer Ear] : the ears and nose were normal in appearance [Hearing Threshold Finger Rub Not [place]] : hearing was normal [Neck Appearance] : the appearance of the neck was normal [Neck Cervical Mass (___cm)] : no neck mass was observed [Auscultation Breath Sounds / Voice Sounds] : lungs were clear to auscultation bilaterally [Heart Rate And Rhythm] : heart rate was normal and rhythm regular [Heart Sounds] : normal S1 and S2 [Heart Sounds Gallop] : no gallops [Murmurs] : no murmurs [Heart Sounds Pericardial Friction Rub] : no pericardial rub [Abdomen Soft] : soft [Bowel Sounds] : normal bowel sounds [Abdomen Tenderness] : non-tender [] : no hepato-splenomegaly [Abdomen Mass (___ Cm)] : no abdominal mass palpated [Cervical Lymph Nodes Enlarged Posterior Bilaterally] : posterior cervical [Cervical Lymph Nodes Enlarged Anterior Bilaterally] : anterior cervical [Supraclavicular Lymph Nodes Enlarged Bilaterally] : supraclavicular [Nail Clubbing] : no clubbing  or cyanosis of the fingernails [Abnormal Walk] : normal gait [Oriented To Time, Place, And Person] : oriented to person, place, and time [Impaired Insight] : insight and judgment were intact [Affect] : the affect was normal

## 2021-02-23 NOTE — HISTORY OF PRESENT ILLNESS
[de-identified] : 45 year old man with history of schizophrenia who is accompanied by his brother who presents for a colonoscopy.  Brother says patient has a disability from schizophrenia and would like to speak for patient.  Brother Roman Douglass who lives with patient is the health care proxy. \par \par Patient is poor historian because of disability. \par \par Father has had a lot of colon polyps, brother has had colon polyps.  Sister who is in her 40s has had colon polyps. \par \par Patient has been straining to have a bowel movement - there has been red blood in stool. \par He has been constipated - has a bowel movement twice a day.  DIarrhea on occasion. \par Patient denies abdominal pain. \par No nausea and no vomiting. \par \par No loss of appetite, no loss of weight.\par \par Brother says he has had "trouble swallowing" - "chokes" on foods - coughs on occasion.  No heartburn. \par \par \par All other review of systems are negative.  Denies cardiac symptoms.\par

## 2021-02-23 NOTE — ASSESSMENT
[FreeTextEntry1] : IMPRESSION: \par 1.  Constipation\par 2.  Red blood in stool\par 3.  Dysphagia - occasional\par 4.  Family history of colon polyps\par 5.  Schizophrenia - Brother is a health care proxy \par \par \par \par \par \par PLAN: \par I have advised the patient to arrange for a colonoscopy to rule out colon polyps, colorectal cancer etc. under monitored anesthesia care.  Risks such as perforation  (4 in 10,000) requiring surgery, bleeding (8 in 10,000), infection, diverticulitis, colitis, missed colon cancer (2% to 6%), internal organ injury, etc, risks of bowel prep including colitis, syncope, adverse reaction to medication etc. and risks of anesthesia including cardiopulmonary compromise were discussed with patient and his Brother Roman.  Patient and brother verbalized understanding and agree to proceed with the planned procedure.\par \par I will plan for an upper endoscopy under monitored anesthesia care to rule out esophageal stricture, peptic ulcer disease, H.pylori gastritis etc.   Risks, benefits, and alternatives of the procedure were discussed with the patient and his brother who is health care proxy. Patient and brother understand and agree to proceed with the planned procedure.\par \par Miralax once a day\par \par Dr. MARRERO psychiatrist [phone]\par \par \par

## 2021-03-03 ENCOUNTER — RESULT CHARGE (OUTPATIENT)
Age: 46
End: 2021-03-03

## 2021-03-04 ENCOUNTER — APPOINTMENT (OUTPATIENT)
Dept: PULMONOLOGY | Facility: CLINIC | Age: 46
End: 2021-03-04
Payer: MEDICARE

## 2021-03-04 VITALS
WEIGHT: 194 LBS | SYSTOLIC BLOOD PRESSURE: 136 MMHG | HEIGHT: 66 IN | DIASTOLIC BLOOD PRESSURE: 84 MMHG | HEART RATE: 76 BPM | BODY MASS INDEX: 31.18 KG/M2 | OXYGEN SATURATION: 98 %

## 2021-03-04 DIAGNOSIS — R05 COUGH: ICD-10-CM

## 2021-03-04 PROCEDURE — 71046 X-RAY EXAM CHEST 2 VIEWS: CPT

## 2021-03-04 PROCEDURE — 99204 OFFICE O/P NEW MOD 45 MIN: CPT | Mod: 25

## 2021-03-04 NOTE — PROCEDURE
[FreeTextEntry1] : PA and lateral chest xray performed using standard projections. Bones and soft tissues structures are unremarkable.Cardiac silhouette appears normal. Lung fields are clear. No infiltrate or masses noted. Mediastinal contours are normal.\par IMPRESSION: no acute cardiopulmonary disease\par \par \par

## 2021-03-04 NOTE — ASSESSMENT
[FreeTextEntry1] : covid swab today\par formal pfts\par feno\par also needs HST  to r/o DIMITRIS\par ct with IV contrast done in 12/2021\par

## 2021-03-04 NOTE — PHYSICAL EXAM
[No Acute Distress] : no acute distress [Well Nourished] : well nourished [Well Developed] : well developed [IV] : Mallampati Class: IV [Normal Rate/Rhythm] : normal rate/rhythm [No Resp Distress] : no resp distress [No Acc Muscle Use] : no acc muscle use [No Focal Deficits] : no focal deficits

## 2021-03-04 NOTE — HISTORY OF PRESENT ILLNESS
[Never] : never [TextBox_4] : CHEKO ARMENTA is a 45 year old male who presents with cough X 2 years\par occurs randomly during the day. non productive . \par went to [facility] for cough/ difficutly eating/choking\par had CT chest done \par pending GI eval\par \par denies smoking\par no evaping\par \par \par + apneas. + nocturnal cough + snoring\par no dream recall. no bruxism.\par no driving.\par \par

## 2021-03-05 DIAGNOSIS — Z00.00 ENCOUNTER FOR GENERAL ADULT MEDICAL EXAMINATION W/OUT ABNORMAL FINDINGS: ICD-10-CM

## 2021-03-05 LAB — SARS-COV-2 N GENE NPH QL NAA+PROBE: NOT DETECTED

## 2021-03-08 ENCOUNTER — APPOINTMENT (OUTPATIENT)
Dept: PULMONOLOGY | Facility: CLINIC | Age: 46
End: 2021-03-08
Payer: MEDICARE

## 2021-03-08 PROCEDURE — 94010 BREATHING CAPACITY TEST: CPT

## 2021-03-08 PROCEDURE — 95800 SLP STDY UNATTENDED: CPT

## 2021-03-09 PROCEDURE — 95800 SLP STDY UNATTENDED: CPT

## 2021-03-11 ENCOUNTER — APPOINTMENT (OUTPATIENT)
Dept: PULMONOLOGY | Facility: CLINIC | Age: 46
End: 2021-03-11
Payer: MEDICARE

## 2021-03-11 VITALS
BODY MASS INDEX: 31.18 KG/M2 | WEIGHT: 194 LBS | HEART RATE: 71 BPM | DIASTOLIC BLOOD PRESSURE: 68 MMHG | OXYGEN SATURATION: 96 % | SYSTOLIC BLOOD PRESSURE: 101 MMHG | HEIGHT: 66 IN | RESPIRATION RATE: 14 BRPM

## 2021-03-11 PROCEDURE — 99212 OFFICE O/P EST SF 10 MIN: CPT

## 2021-03-11 NOTE — ASSESSMENT
[FreeTextEntry1] : brother states they returned HST today\par will f/u on the read of HST device\par we did discuss DIMITRIS/pathophysiology and treatment options\par will hold on any meds for cough during the day\par

## 2021-03-11 NOTE — HISTORY OF PRESENT ILLNESS
[TextBox_4] : CHEKO ARMENTA is a 45 year old male who presents to f/u his pfts\par \par hard to perform test- not reproducible\par unable to do feno\par \par \par brother is with patient- notes choking/ gasping at night when he is sleeping\par

## 2021-03-11 NOTE — PHYSICAL EXAM
[No Acute Distress] : no acute distress [Well Nourished] : well nourished [Well Developed] : well developed [Normal S1, S2] : normal s1, s2 [No Resp Distress] : no resp distress [Oriented x3] : oriented x3

## 2021-05-10 DIAGNOSIS — K62.5 HEMORRHAGE OF ANUS AND RECTUM: ICD-10-CM

## 2021-05-17 ENCOUNTER — TRANSCRIPTION ENCOUNTER (OUTPATIENT)
Age: 46
End: 2021-05-17

## 2021-05-17 LAB — SARS-COV-2 N GENE NPH QL NAA+PROBE: NOT DETECTED

## 2021-05-18 ENCOUNTER — OUTPATIENT (OUTPATIENT)
Dept: OUTPATIENT SERVICES | Facility: HOSPITAL | Age: 46
LOS: 1 days | End: 2021-05-18
Payer: MEDICARE

## 2021-05-18 ENCOUNTER — APPOINTMENT (OUTPATIENT)
Dept: GASTROENTEROLOGY | Facility: HOSPITAL | Age: 46
End: 2021-05-18

## 2021-05-18 ENCOUNTER — RESULT REVIEW (OUTPATIENT)
Age: 46
End: 2021-05-18

## 2021-05-18 DIAGNOSIS — K59.09 OTHER CONSTIPATION: ICD-10-CM

## 2021-05-18 DIAGNOSIS — R13.10 DYSPHAGIA, UNSPECIFIED: ICD-10-CM

## 2021-05-18 PROCEDURE — 43239 EGD BIOPSY SINGLE/MULTIPLE: CPT

## 2021-05-18 PROCEDURE — 88342 IMHCHEM/IMCYTCHM 1ST ANTB: CPT | Mod: 26

## 2021-05-18 PROCEDURE — 88342 IMHCHEM/IMCYTCHM 1ST ANTB: CPT

## 2021-05-18 PROCEDURE — 88312 SPECIAL STAINS GROUP 1: CPT

## 2021-05-18 PROCEDURE — 88312 SPECIAL STAINS GROUP 1: CPT | Mod: 26

## 2021-05-18 PROCEDURE — 88305 TISSUE EXAM BY PATHOLOGIST: CPT | Mod: 26

## 2021-05-18 PROCEDURE — 45385 COLONOSCOPY W/LESION REMOVAL: CPT

## 2021-05-18 PROCEDURE — 88305 TISSUE EXAM BY PATHOLOGIST: CPT

## 2021-05-23 ENCOUNTER — NON-APPOINTMENT (OUTPATIENT)
Age: 46
End: 2021-05-23

## 2021-06-02 ENCOUNTER — NON-APPOINTMENT (OUTPATIENT)
Age: 46
End: 2021-06-02

## 2022-01-03 ENCOUNTER — APPOINTMENT (OUTPATIENT)
Dept: GASTROENTEROLOGY | Facility: CLINIC | Age: 47
End: 2022-01-03
Payer: MEDICARE

## 2022-01-03 VITALS
OXYGEN SATURATION: 97 % | BODY MASS INDEX: 31.64 KG/M2 | SYSTOLIC BLOOD PRESSURE: 143 MMHG | HEART RATE: 89 BPM | WEIGHT: 196 LBS | DIASTOLIC BLOOD PRESSURE: 88 MMHG

## 2022-01-03 PROCEDURE — 99214 OFFICE O/P EST MOD 30 MIN: CPT

## 2022-01-03 RX ORDER — CEPHALEXIN 500 MG/1
500 CAPSULE ORAL
Qty: 21 | Refills: 0 | Status: ACTIVE | COMMUNITY
Start: 2021-09-13

## 2022-01-03 RX ORDER — CLOZAPINE 100 MG/1
100 TABLET ORAL
Qty: 56 | Refills: 0 | Status: ACTIVE | COMMUNITY
Start: 2021-12-15

## 2022-01-03 RX ORDER — BENZTROPINE MESYLATE 0.5 MG/1
0.5 TABLET ORAL
Qty: 56 | Refills: 0 | Status: ACTIVE | COMMUNITY
Start: 2021-12-15

## 2022-01-03 RX ORDER — DIVALPROEX SODIUM 500 MG/1
500 TABLET, DELAYED RELEASE ORAL
Qty: 112 | Refills: 0 | Status: ACTIVE | COMMUNITY
Start: 2021-12-15

## 2022-01-03 RX ORDER — HALOPERIDOL DECANOATE 100 MG/ML
100 INJECTION INTRAMUSCULAR
Qty: 2 | Refills: 0 | Status: ACTIVE | COMMUNITY
Start: 2021-11-08

## 2022-01-03 RX ORDER — CLOZAPINE 50 MG/1
50 TABLET ORAL
Qty: 28 | Refills: 0 | Status: ACTIVE | COMMUNITY
Start: 2021-12-15

## 2022-01-03 RX ORDER — DIVALPROEX SODIUM 500 1/1
500 TABLET, EXTENDED RELEASE ORAL
Qty: 360 | Refills: 0 | Status: ACTIVE | COMMUNITY
Start: 2021-09-23

## 2022-01-03 NOTE — HISTORY OF PRESENT ILLNESS
[de-identified] : 46 year old man with history of schizophrenia who presents for a colonoscopy.  His brother Roman accompanies him and helps with history given patients disability from schizophrenia. Brother Roman Douglass who lives with patient is the health care proxy. \par \par Brother says he strains at times to have a bowel movement - he goes to the bathroom 3 to 4 times a day - patient reports he has seen a "little bit of red blood" in stool.  Did not try MiraLAX as suggested for constipation in past.  Patient denies having abdominal pain, loss of appetite, weight loss, and hematochezia.\par \par Patient denies having heartburn, regurgitation, difficulty swallowing, painful swallowing, nausea, vomiting. \par \par \par \par \par Initial office visit 2/2021: \par Patient is poor historian because of disability. \par \par Father has had a lot of colon polyps, brother has had colon polyps. Sister who is in her 40s has had colon polyps. \par \par Patient has been straining to have a bowel movement - there has been red blood in stool. \par He has been constipated - has a bowel movement twice a day. DIarrhea on occasion. \par Patient denies abdominal pain. \par No nausea and no vomiting. \par \par No loss of appetite, no loss of weight.\par \par Brother says he has had "trouble swallowing" - "chokes" on foods - coughs on occasion. No heartburn. \par \par \par

## 2022-01-03 NOTE — ASSESSMENT
[FreeTextEntry1] : IMPRESSION: \par #  History of chronic constipation - previously advised MiraLAX once a day\par -  Colonoscopy 5/2021:  Small sessile ascending polyp s/p cold snare (tubular adenoma).  Multiple small polyps in sigmoid colon - six of the polyps were removed by cold snare (hyperplastic polyps, lymphoid aggregates).  Few small rectal polyp - were removed by cold snare (lymphoid hyperplasia).  Fair prep.  Repeat colonoscopy in 6 months. \par  \par #  Dysphagia -in the past - no longer occurring\par -  EGD 5/2021:  Normal GE junction (negative pathology), mild antral erythema s/p bx of A/B (negative for HP and IM).  Normal duodenum. \par \par #  Family history of colon polyps (Father, brother and sister in her 40s)\par #  Comorbidities:  Schizophrenia - Brother is a health care proxy \par \par \par \par \par \par \par PLAN: \par I have advised the patient to arrange for a colonoscopy to rule out colon polyps, colorectal cancer etc. under monitored anesthesia care.  Risks such as perforation  (4 in 10,000) requiring surgery, bleeding (8 in 10,000), infection, diverticulitis, colitis, missed colon cancer (2% to 6%), internal organ injury, etc, risks of bowel prep including colitis, syncope, adverse reaction to medication etc. and risks of anesthesia including cardiopulmonary compromise were discussed with patient.  Patient verbalized understanding and agrees to proceed with the planned procedure.  Two day bowel prep advised given prior prep quality - side effects reviewed - adequate hydration advised.  \par \par

## 2022-01-03 NOTE — PHYSICAL EXAM
[General Appearance - Alert] : alert [General Appearance - In No Acute Distress] : in no acute distress [Sclera] : the sclera and conjunctiva were normal [Extraocular Movements] : extraocular movements were intact [Outer Ear] : the ears and nose were normal in appearance [Hearing Threshold Finger Rub Not [place]] : hearing was normal [Neck Appearance] : the appearance of the neck was normal [Neck Cervical Mass (___cm)] : no neck mass was observed [Auscultation Breath Sounds / Voice Sounds] : lungs were clear to auscultation bilaterally [Heart Rate And Rhythm] : heart rate was normal and rhythm regular [Heart Sounds] : normal S1 and S2 [Heart Sounds Gallop] : no gallops [Murmurs] : no murmurs [Heart Sounds Pericardial Friction Rub] : no pericardial rub [Bowel Sounds] : normal bowel sounds [Abdomen Soft] : soft [Abdomen Tenderness] : non-tender [Abdomen Mass (___ Cm)] : no abdominal mass palpated [Cervical Lymph Nodes Enlarged Posterior Bilaterally] : posterior cervical [Cervical Lymph Nodes Enlarged Anterior Bilaterally] : anterior cervical [Supraclavicular Lymph Nodes Enlarged Bilaterally] : supraclavicular [Abnormal Walk] : normal gait [Nail Clubbing] : no clubbing  or cyanosis of the fingernails [Skin Color & Pigmentation] : normal skin color and pigmentation [] : no rash [Oriented To Time, Place, And Person] : oriented to person, place, and time [Impaired Insight] : insight and judgment were intact [Affect] : the affect was normal

## 2022-01-18 LAB — SARS-COV-2 N GENE NPH QL NAA+PROBE: NOT DETECTED

## 2022-01-19 ENCOUNTER — TRANSCRIPTION ENCOUNTER (OUTPATIENT)
Age: 47
End: 2022-01-19

## 2022-01-20 ENCOUNTER — RESULT REVIEW (OUTPATIENT)
Age: 47
End: 2022-01-20

## 2022-01-20 ENCOUNTER — APPOINTMENT (OUTPATIENT)
Dept: GASTROENTEROLOGY | Facility: HOSPITAL | Age: 47
End: 2022-01-20

## 2022-01-20 ENCOUNTER — OUTPATIENT (OUTPATIENT)
Dept: OUTPATIENT SERVICES | Facility: HOSPITAL | Age: 47
LOS: 1 days | End: 2022-01-20
Payer: MEDICARE

## 2022-01-20 DIAGNOSIS — K59.09 OTHER CONSTIPATION: ICD-10-CM

## 2022-01-20 DIAGNOSIS — R13.10 DYSPHAGIA, UNSPECIFIED: ICD-10-CM

## 2022-01-20 PROCEDURE — 88305 TISSUE EXAM BY PATHOLOGIST: CPT

## 2022-01-20 PROCEDURE — 45380 COLONOSCOPY AND BIOPSY: CPT | Mod: XS

## 2022-01-20 PROCEDURE — 45385 COLONOSCOPY W/LESION REMOVAL: CPT

## 2022-01-20 PROCEDURE — 88305 TISSUE EXAM BY PATHOLOGIST: CPT | Mod: 26

## 2022-01-20 DEVICE — CATH BALLOON DIL 6-8MM: Type: IMPLANTABLE DEVICE | Status: FUNCTIONAL

## 2022-01-20 DEVICE — SYR ORISE GEL SNGL PACK: Type: IMPLANTABLE DEVICE | Status: FUNCTIONAL

## 2022-01-20 DEVICE — HEMOSPRAY HEMOSTAT ENDO 7F: Type: IMPLANTABLE DEVICE | Status: FUNCTIONAL

## 2022-01-20 DEVICE — CLIP RESOLUTION 360 235CM: Type: IMPLANTABLE DEVICE | Status: FUNCTIONAL

## 2022-01-25 ENCOUNTER — NON-APPOINTMENT (OUTPATIENT)
Age: 47
End: 2022-01-25

## 2022-07-05 NOTE — ED ADULT NURSE REASSESSMENT NOTE - TEMPLATE LIST FOR HEAD TO TOE ASSESSMENT
Lvm for pt to phone office and schedule  \"Return in about 2 weeks (around 7/19/2022) for URSZULA.\"   Respiratory

## 2022-12-20 ENCOUNTER — APPOINTMENT (OUTPATIENT)
Dept: GASTROENTEROLOGY | Facility: CLINIC | Age: 47
End: 2022-12-20

## 2022-12-20 VITALS
DIASTOLIC BLOOD PRESSURE: 78 MMHG | HEART RATE: 89 BPM | HEIGHT: 66 IN | SYSTOLIC BLOOD PRESSURE: 158 MMHG | OXYGEN SATURATION: 96 % | TEMPERATURE: 98.5 F

## 2022-12-20 DIAGNOSIS — Z12.11 ENCOUNTER FOR SCREENING FOR MALIGNANT NEOPLASM OF COLON: ICD-10-CM

## 2022-12-20 DIAGNOSIS — Z12.12 ENCOUNTER FOR SCREENING FOR MALIGNANT NEOPLASM OF COLON: ICD-10-CM

## 2022-12-20 PROCEDURE — 99214 OFFICE O/P EST MOD 30 MIN: CPT

## 2022-12-20 NOTE — ASSESSMENT
[FreeTextEntry1] : IMPRESSION: \par # History of multiple colon polyps. \par -  Colonoscopy 1/2022 by Dr. Barrios:  One small ascending colon polyp removed (tubular adenoma).  Eight small sigmoid colon polyps removed by cold snare (hyperplastic).  Good prep with a two day prep.  Repeat in one year.  \par - Colonoscopy 5/2021: Small ascending polyp s/p cold snare (tubular adenoma). Multiple small polyps in sigmoid colon - six of the polyps were removed by cold snare (hyperplastic polyps, lymphoid aggregates). Few small rectal polyp - were removed by cold snare (lymphoid hyperplasia). Fair prep. Repeat colonoscopy in 6 months. \par -  History of chronic constipation - previously advised MiraLAX once a day\par  \par # Dysphagia -in the past - no longer occurring\par - EGD 5/2021: Normal GE junction (negative pathology), mild antral erythema s/p bx of A/B (negative for HP and IM). Normal duodenum. \par \par # Family history of colon polyps (Father, brother and sister in her 40s)\par # Comorbidities: Schizophrenia - Brother is a health care proxy \par \par \par \par \par PLAN: \par I have advised the patient to arrange for a colonoscopy to rule out colon polyps, colorectal cancer etc. under monitored anesthesia care.  Risks such as perforation requiring surgery, colostomy, bleeding requiring blood transfusion, infection/sepsis, diverticulitis, colitis, missed colon cancer (2% to 6%), internal organ injury such as splenic hemorrhage etc, adverse reaction to medication etc. and risks of anesthesia including cardiopulmonary compromise requiring ICU care were discussed with patient.  Alternatives were discussed.  Patient verbalized understanding and agrees to proceed with a colonoscopy under anesthesia.\par \par  Two day bowel prep advised given prior prep quality - side effects reviewed - adequate hydration advised. \par \par MiraLAX once a day for constipation.

## 2022-12-20 NOTE — PHYSICAL EXAM
[Bowel Sounds] : normal bowel sounds [Abdomen Tenderness] : non-tender [No Masses] : no abdominal mass palpated [Abdomen Soft] : soft [Abnormal Walk] : normal gait [No Clubbing, Cyanosis] : no clubbing or cyanosis of the fingernails [Normal Color / Pigmentation] : normal skin color and pigmentation [] : no rash [Normal] : oriented to person, place, and time

## 2022-12-20 NOTE — HISTORY OF PRESENT ILLNESS
[FreeTextEntry1] : 47 year old man with history of schizophrenia who presents for a surveillance colonoscopy. His brother Roman accompanies him and helps with history given patients disability from schizophrenia. Brother Roman Douglass who lives with patient is the health care proxy. \par \par His brother reports that patient strains a lot to have a bowel movement - his brother says he does not MiraLAX regularly.  When feeling constipated he will have abdominal discomfort. No melena and no hematochezia.  He has gained weight. No vomiting.  No dysphagia. \par \par \par \par \par \par Initial office visit 2/2021: \par Patient is poor historian because of disability. \par Father has had a lot of colon polyps, brother has had colon polyps. Sister who is in her 40s has had colon polyps. \par Patient has been straining to have a bowel movement - there has been red blood in stool. He has been constipated - has a bowel movement twice a day. DIarrhea on occasion. Patient denies abdominal pain. No nausea and no vomiting. \par \par No loss of appetite, no loss of weight.  Brother says he has had "trouble swallowing" - "chokes" on foods - coughs on occasion. No heartburn. \par \par \par Office visit 1/2022: \par Brother says he strains at times to have a bowel movement - he goes to the bathroom 3 to 4 times a day - patient reports he has seen a "little bit of red blood" in stool. Did not try MiraLAX as suggested for constipation in past. Patient denies having abdominal pain, loss of appetite, weight loss, and hematochezia.\par \par Patient denies having heartburn, regurgitation, difficulty swallowing, painful swallowing, nausea, vomiting.

## 2022-12-21 DIAGNOSIS — K63.5 POLYP OF COLON: ICD-10-CM

## 2023-01-12 ENCOUNTER — EMERGENCY (EMERGENCY)
Facility: HOSPITAL | Age: 48
LOS: 1 days | Discharge: HOME | End: 2023-01-12
Attending: EMERGENCY MEDICINE | Admitting: EMERGENCY MEDICINE
Payer: MEDICARE

## 2023-01-12 VITALS
DIASTOLIC BLOOD PRESSURE: 91 MMHG | TEMPERATURE: 98 F | WEIGHT: 195.99 LBS | SYSTOLIC BLOOD PRESSURE: 158 MMHG | OXYGEN SATURATION: 98 % | HEIGHT: 66 IN | HEART RATE: 78 BPM | RESPIRATION RATE: 18 BRPM

## 2023-01-12 VITALS
OXYGEN SATURATION: 97 % | DIASTOLIC BLOOD PRESSURE: 68 MMHG | HEART RATE: 83 BPM | SYSTOLIC BLOOD PRESSURE: 162 MMHG | TEMPERATURE: 98 F | RESPIRATION RATE: 18 BRPM

## 2023-01-12 PROCEDURE — 73130 X-RAY EXAM OF HAND: CPT | Mod: 26,LT

## 2023-01-12 PROCEDURE — 73130 X-RAY EXAM OF HAND: CPT

## 2023-01-12 PROCEDURE — 99284 EMERGENCY DEPT VISIT MOD MDM: CPT | Mod: 25

## 2023-01-12 PROCEDURE — 70450 CT HEAD/BRAIN W/O DYE: CPT | Mod: 26,MA

## 2023-01-12 PROCEDURE — 93010 ELECTROCARDIOGRAM REPORT: CPT

## 2023-01-12 PROCEDURE — 70486 CT MAXILLOFACIAL W/O DYE: CPT | Mod: MA

## 2023-01-12 PROCEDURE — 93005 ELECTROCARDIOGRAM TRACING: CPT

## 2023-01-12 PROCEDURE — 99285 EMERGENCY DEPT VISIT HI MDM: CPT

## 2023-01-12 PROCEDURE — 70450 CT HEAD/BRAIN W/O DYE: CPT | Mod: MA

## 2023-01-12 PROCEDURE — 72125 CT NECK SPINE W/O DYE: CPT | Mod: 26,MA

## 2023-01-12 PROCEDURE — 70486 CT MAXILLOFACIAL W/O DYE: CPT | Mod: 26,MA

## 2023-01-12 PROCEDURE — 72125 CT NECK SPINE W/O DYE: CPT | Mod: MA

## 2023-01-12 RX ORDER — ACETAMINOPHEN 500 MG
650 TABLET ORAL ONCE
Refills: 0 | Status: COMPLETED | OUTPATIENT
Start: 2023-01-12 | End: 2023-01-12

## 2023-01-12 RX ORDER — BENZTROPINE MESYLATE 1 MG
1 TABLET ORAL
Qty: 0 | Refills: 0 | DISCHARGE

## 2023-01-12 RX ORDER — DIVALPROEX SODIUM 500 MG/1
3 TABLET, DELAYED RELEASE ORAL
Qty: 0 | Refills: 0 | DISCHARGE

## 2023-01-12 RX ORDER — CLOZAPINE 150 MG/1
100 TABLET, ORALLY DISINTEGRATING ORAL ONCE
Refills: 0 | Status: COMPLETED | OUTPATIENT
Start: 2023-01-12 | End: 2023-01-12

## 2023-01-12 RX ORDER — BENZTROPINE MESYLATE 1 MG
0.5 TABLET ORAL ONCE
Refills: 0 | Status: COMPLETED | OUTPATIENT
Start: 2023-01-12 | End: 2023-01-12

## 2023-01-12 RX ORDER — DIVALPROEX SODIUM 500 MG/1
1 TABLET, DELAYED RELEASE ORAL
Qty: 0 | Refills: 0 | DISCHARGE

## 2023-01-12 RX ORDER — CLOZAPINE 150 MG/1
150 TABLET, ORALLY DISINTEGRATING ORAL ONCE
Refills: 0 | Status: DISCONTINUED | OUTPATIENT
Start: 2023-01-12 | End: 2023-01-12

## 2023-01-12 RX ORDER — BACITRACIN ZINC 500 UNIT/G
1 OINTMENT IN PACKET (EA) TOPICAL ONCE
Refills: 0 | Status: COMPLETED | OUTPATIENT
Start: 2023-01-12 | End: 2023-01-12

## 2023-01-12 RX ORDER — HALOPERIDOL DECANOATE 100 MG/ML
0 INJECTION INTRAMUSCULAR
Qty: 0 | Refills: 0 | DISCHARGE

## 2023-01-12 RX ADMIN — CLOZAPINE 100 MILLIGRAM(S): 150 TABLET, ORALLY DISINTEGRATING ORAL at 07:11

## 2023-01-12 RX ADMIN — Medication 650 MILLIGRAM(S): at 07:11

## 2023-01-12 RX ADMIN — Medication 650 MILLIGRAM(S): at 02:37

## 2023-01-12 RX ADMIN — Medication 0.5 MILLIGRAM(S): at 07:11

## 2023-01-12 RX ADMIN — Medication 1 APPLICATION(S): at 02:41

## 2023-01-12 NOTE — ED PROVIDER NOTE - NSDCPRINTRESULTS_ED_ALL_ED
Patient requests all Lab, Cardiology, and Radiology Results on their Discharge Instructions patient c/o of chest pain with N/V started this morning , history of DM

## 2023-01-12 NOTE — ED ADULT TRIAGE NOTE - CHIEF COMPLAINT QUOTE
47 yr old male c/o "He was eating and choked on food, but then he passed out and hit his head. Now he is having dizziness."

## 2023-01-12 NOTE — ED PROVIDER NOTE - NSFOLLOWUPINSTRUCTIONS_ED_ALL_ED_FT
-- You should update your primary care physician on your Emergency Department visit and follow up with them.  If you do not have a physician or have difficulty following up, please call: [phone] to obtain a [facility] doctor or specialist who can provide follow up.    -- Return to the ER for worsening or persistent symptoms, and/or ANY NEW OR CONCERNING SYMPTOMS.

## 2023-01-12 NOTE — ED PROVIDER NOTE - PATIENT PORTAL LINK FT
You can access the FollowMyHealth Patient Portal offered by [facility] by registering at the following website: [email or web address]. By joining [employer]’s FollowMyHealth portal, you will also be able to view your health information using other applications (apps) compatible with our system.

## 2023-01-12 NOTE — ED PROVIDER NOTE - CLINICAL SUMMARY MEDICAL DECISION MAKING FREE TEXT BOX
47-year-old male history of schizophrenia, difficulty swallowing, was eating food choked on it and passed out and hitting head, leg of dizziness headache, follow-up CT head rule out intracranial trauma and or fracture, syncope follow-up EKG and Accu-Chek rule out hypoglycemia, patient at normal mental baseline to apply bacitracin to abrasion and give Tylenol for headache.

## 2023-01-12 NOTE — ED ADULT NURSE NOTE - OBJECTIVE STATEMENT
Received pt from outgoing RN at 07:15 am.   Pt alert and calm.   Respirations even and unlabored.  occasional dry cough noted, no drooling, no saliva noted at this time.   Pt denies cp/sob/palpitations.   Pt in no acute distress.   Pt brother expressed concerns about medication list, med list updated per brothers orders and MD made aware.   Pt in no acute distress.   Denies dizziness/cp/sob/palpitations. BN

## 2023-01-12 NOTE — ED ADULT NURSE REASSESSMENT NOTE - NS ED NURSE REASSESS COMMENT FT1
Pt awaiting CT scan results. Per CT tech, there is a system wide downtime of reading results, will try to expedite result. Pt updated by primary RN. MD Mott made aware.

## 2023-01-12 NOTE — ED PROVIDER NOTE - DIFFERENTIAL DIAGNOSIS
Periorbital fracture, subdural hematoma, intracranial bleed, hypoglycemia, cardiac arrhythmia Differential Diagnosis

## 2023-01-12 NOTE — ED PROVIDER NOTE - OBJECTIVE STATEMENT
47-year-old male history of schizophrenia, history of weak and delayed swallow, presents to ER with his brother who is his healthcare proxy states that while at program patient was eating food and choked on it and passed out and hit his head patient complaining of dizziness, patient complaining of headache no vomiting.

## 2023-01-12 NOTE — ED ADULT NURSE REASSESSMENT NOTE - NS ED NURSE REASSESS COMMENT FT1
Patient is currently lying down on a stretcher calm, in NAD, watching TV with brother at the bedside. He [id number] ordered meds and states that he is comfortable. Awaiting further Md evaluations, will closely monitor, safety maintained.

## 2023-01-16 LAB — SARS-COV-2 N GENE NPH QL NAA+PROBE: NOT DETECTED

## 2023-01-17 ENCOUNTER — TRANSCRIPTION ENCOUNTER (OUTPATIENT)
Age: 48
End: 2023-01-17

## 2023-01-18 ENCOUNTER — APPOINTMENT (OUTPATIENT)
Dept: GASTROENTEROLOGY | Facility: HOSPITAL | Age: 48
End: 2023-01-18

## 2023-01-18 ENCOUNTER — OUTPATIENT (OUTPATIENT)
Dept: OUTPATIENT SERVICES | Facility: HOSPITAL | Age: 48
LOS: 1 days | End: 2023-01-18
Payer: MEDICARE

## 2023-01-18 DIAGNOSIS — K63.5 POLYP OF COLON: ICD-10-CM

## 2023-01-18 PROCEDURE — 88305 TISSUE EXAM BY PATHOLOGIST: CPT

## 2023-01-18 PROCEDURE — 45385 COLONOSCOPY W/LESION REMOVAL: CPT | Mod: PT

## 2023-01-18 PROCEDURE — 88305 TISSUE EXAM BY PATHOLOGIST: CPT | Mod: 26

## 2023-01-18 PROCEDURE — 45385 COLONOSCOPY W/LESION REMOVAL: CPT

## 2023-01-18 DEVICE — CLIP RESOLUTION 360 235CM: Type: IMPLANTABLE DEVICE | Status: FUNCTIONAL

## 2023-04-26 ENCOUNTER — NON-APPOINTMENT (OUTPATIENT)
Age: 48
End: 2023-04-26

## 2023-04-26 ENCOUNTER — APPOINTMENT (OUTPATIENT)
Dept: UROLOGY | Facility: CLINIC | Age: 48
End: 2023-04-26
Payer: MEDICARE

## 2023-04-26 ENCOUNTER — EMERGENCY (EMERGENCY)
Facility: HOSPITAL | Age: 48
LOS: 1 days | Discharge: HOME | End: 2023-04-26
Attending: INTERNAL MEDICINE | Admitting: STUDENT IN AN ORGANIZED HEALTH CARE EDUCATION/TRAINING PROGRAM
Payer: MEDICARE

## 2023-04-26 VITALS
SYSTOLIC BLOOD PRESSURE: 145 MMHG | WEIGHT: 190.04 LBS | OXYGEN SATURATION: 95 % | HEART RATE: 99 BPM | TEMPERATURE: 98 F | DIASTOLIC BLOOD PRESSURE: 82 MMHG | HEIGHT: 67 IN | RESPIRATION RATE: 18 BRPM

## 2023-04-26 VITALS
BODY MASS INDEX: 31.5 KG/M2 | TEMPERATURE: 98.6 F | HEIGHT: 66 IN | DIASTOLIC BLOOD PRESSURE: 74 MMHG | WEIGHT: 196 LBS | SYSTOLIC BLOOD PRESSURE: 126 MMHG

## 2023-04-26 DIAGNOSIS — N40.0 BENIGN PROSTATIC HYPERPLASIA WITHOUT LOWER URINARY TRACT SYMPMS: ICD-10-CM

## 2023-04-26 PROCEDURE — 99214 OFFICE O/P EST MOD 30 MIN: CPT

## 2023-04-26 PROCEDURE — 71046 X-RAY EXAM CHEST 2 VIEWS: CPT

## 2023-04-26 PROCEDURE — 71046 X-RAY EXAM CHEST 2 VIEWS: CPT | Mod: 26

## 2023-04-26 PROCEDURE — 99283 EMERGENCY DEPT VISIT LOW MDM: CPT | Mod: 25

## 2023-04-26 PROCEDURE — 99284 EMERGENCY DEPT VISIT MOD MDM: CPT | Mod: FS

## 2023-04-26 RX ORDER — AZITHROMYCIN 500 MG/1
1 TABLET, FILM COATED ORAL
Qty: 6 | Refills: 0
Start: 2023-04-26 | End: 2023-04-30

## 2023-04-26 NOTE — ED PROVIDER NOTE - CARE PROVIDER_API CALL
Erick Saenz)  [facility] Medicine  [address] [place] [address]  Phone: [phone]  Fax: [phone]  Follow Up Time: 1-3 Days

## 2023-04-26 NOTE — ED ADULT NURSE NOTE - OBJECTIVE STATEMENT
patient comes in with healthcare proxy who is pt brother. pt has had cough for 2 months. has not seen PCP. denies fevers, chills. no asthma. cough does not subside with medications. PMH paranoid schizophrenia. denies smoking

## 2023-04-26 NOTE — ED PROVIDER NOTE - CLINICAL SUMMARY MEDICAL DECISION MAKING FREE TEXT BOX
48-year-old male with cough for 2 months.  Cough is worse at night, productive with yellow sputum.  Associated with some nasal congestion.  Denies fever, chills, chest pain, shortness of breath, abdominal pain, nausea, vomiting, upper or lower extremity weakness or paresthesias.  No recent travel or sick contacts. VSS, physical exam is Stable CXR is normal  RX Zithromax, prednisone and tessalon, stable for discharge with O/P referral given

## 2023-04-26 NOTE — ED PROVIDER NOTE - PATIENT PORTAL LINK FT
You can access the FollowMyHealth Patient Portal offered by [facility] by registering at the following website: [email or web address]. By joining [employer]’s FollowMyHealth portal, you will also be able to view your health information using other applications (apps) compatible with our system.

## 2023-04-26 NOTE — ED PROVIDER NOTE - CARE PLAN
Patient Education     Notify patient (or parent if applicable): If  test results for COVID-19 were positive. If patient is not hospitalized, please direct patient to call PCP for follow up care and further direction. Until then patient should continue to practice quarantining with social distancing until they are no longer contagious based upon all 3 scenarios:    At least 1 day (24 hours) have passed since recovery defined as resolution of fever without the use of fever-reducing medications   And   improvement in respiratory symptoms (e.g., cough, shortness of breath);   And,   At least 10 days have passed since symptoms first appeared    Patient should follow up very closely with their PCP with any additional concerns or seek immediate medical attention at local Emergency Department for re-evaluation if respiratory distress develops  Acute Bacterial Rhinosinusitis (ABRS)    Acute bacterial rhinosinusitis (ABRS) is an infection of your nasal cavity and sinuses. It’s caused by bacteria. Acute means that you’ve had symptoms for less than 4 weeks, but possibly up to 12 weeks.  Understanding your sinuses  The nasal cavity is the large air-filled space behind your nose. The sinuses are a group of spaces formed by the bones of your face. They connect with your nasal cavity. ABRS causes the tissue lining these spaces to become inflamed. Mucus may not drain normally. This leads to facial pain and other symptoms.  What causes ABRS?  ABRS most often follows an upper respiratory infection caused by a virus. Bacteria then infect the lining of your nasal cavity and sinuses. But you can also get ABRS if you have:  · Nasal allergies  · Long-term nasal swelling and congestion not caused by allergies  · Blockage in the nose  Symptoms of ABRS  The symptoms of ABRS may be different for each person and include:  · Nasal congestion or blockage  · Pain or pressure in the face  · Thick, colored drainage from the nose  Other symptoms may  include:  · Runny nose  · Fluid draining from the nose down the throat (postnasal drip)  · Headache  · Cough  · Pain  · Fever  Diagnosing ABRS  ABRS may be diagnosed if you’ve had an upper respiratory infection like a cold and cough for 10 or more days without improvement or with worsening symptoms. Your healthcare provider will ask about your symptoms and your medical history. The provider will check your vital signs, including your temperature. You’ll have a physical exam. The healthcare provider will check your ears, nose, and throat. You likely won’t need any tests. If ABRS comes back, you may have a culture or other tests.  Treatment for ABRS  Treatment may include:  · Antibiotic medicine. This is for symptoms that last for at least 10 to 14 days.  · Nasal corticosteroid medicine. Drops or spray used in the nose can lessen swelling and congestion.  · Over-the-counter pain medicine. This is to lessen sinus pain and pressure.  · Nasal decongestant medicine. Spray or drops may help to lessen congestion. Do not use them for more than a few days.  · Salt wash (saline irrigation). This can help to loosen mucus.  Possible complications of ABRS  ABRS may come back or become long-term (chronic). In rare cases, ABRS may cause complications such as:   · Inflamed tissue around the brain and spinal cord (meningitis)  · Inflamed tissue around the eyes (orbital cellulitis)  · Inflamed bones around the sinuses (osteitis)  These problems may need to be treated in a hospital with intravenous (IV) antibiotic medicine or surgery.  When to call the healthcare provider  Call your healthcare provider if you have any of the following:  · Symptoms that don’t get better, or get worse  · Symptoms that don’t get better after 3 to 5 days on antibiotics  · Trouble seeing  · Swelling around your eyes  · Confusion or trouble staying awake   Date Last Reviewed: 5/1/2017  © 0557-2898 [employer]. [address] [facility], [place],  [address] [place] [address]. All rights reserved. This information is not intended as a substitute for professional medical care. Always follow your healthcare professional's instructions.      --------------------------------------------------------------------------------------------------------------  Starting April 9, 2020, we have new hours and policies:    [facility]    Monday - Friday 8:00 a.m. - 8:00 p.m.  Saturday  8:00 a.m. - 4:00 p.m.  Sunday  CLOSED     -*-*-*-*-*-*-*-  The [facility] is OPEN on SUNDAYS  -*-*-*-*-*-*-*-  During the COVID-19 pandemic:  • As of 7/6/20, [facility] will manage patients with any symptoms  • [facility]\" will ONLY manage patients with COVID-19 symptoms  • [facility] will ONLY manage patients with other NON-Covid-19 symptoms  • All patients presenting to any Urgent Care will be interviewed by a nurse to determine the most appropriate location for their visit    -*-*-*-*-*-*-*-    [email or web address]  See current wait times for [facility] in real-time  Reserve your waiting-room spot in line     [email or web address]  [place] for the patient portal  See test results and make virtual visits with your primary care provider    ----------------  Thank you for choosing [facility] today.  We hope you had a pleasant experience and we look forward to serving your future needs.    If you receive a survey in the mail about today's services, we hope that you will take a few minutes to let us know about your experience.    · If you have any questions about your VISIT, please call [phone]    · If you have any questions about your BILL, please call [phone]    · If you need a copy of your MEDICAL RECORD, please call [phone] or email [email or web address]    · If you have any questions about [facility], please call  [phone]    ------------------------------------------------------------------------------------------------------------  UNLESS OTHERWISE INSTRUCTED BY YOUR URGENT CARE PROVIDER TODAY, all follow-up for your medical issues should be managed by your primary care provider. The Urgent Care does not manage chronic medical issues or refill medications. You are responsible for scheduling and keeping any necessary follow-up visits with your primary care provider after this visit today.   ------------------------------------------------------------------------------------------------------------                Principal Discharge DX:	Cough

## 2023-04-26 NOTE — ED ADULT TRIAGE NOTE - AS HEIGHT TYPE
stated Area L Indication Text: Tumors in this location are included in Area L (trunk and extremities).  Mohs surgery is indicated for larger tumors, or tumors with aggressive histologic features, in these anatomic locations.

## 2023-04-26 NOTE — ED PROVIDER NOTE - OBJECTIVE STATEMENT
48-year-old male with history of schizophrenia presents to the ED with his brother (who is healthcare proxy) for cough for 2 months.  Cough is worse at night, productive with yellow sputum.  Associated with some nasal congestion.  Denies fever, chills, chest pain, shortness of breath, abdominal pain, nausea, vomiting, upper or lower extremity weakness or paresthesias.  No recent travel or sick contacts.

## 2023-04-26 NOTE — ED ADULT TRIAGE NOTE - NS ED NURSE BANDS TYPE
Patient Information     Patient Name MRN Sex     Joana Caicedo [id number] Female 1964      Progress Notes by Rica Cano NP at 2017  1:45 PM     Author:  Rica Cano NP Service:  (none) Author Type:  PHYS- Nurse Practitioner     Filed:  2017  2:48 PM Encounter Date:  2017 Status:  Signed     :  Rica Cano NP (PHYS- Nurse Practitioner)            HPI:    Joana Caicedo is a 53 y.o. female who presents to clinic today for cough.  Cough for 10 days.  Coughing up yellow phlegm. Chest congestion.  Mild chest tightness.  Burning in chest with coughing, slowly improving.  Hard coughing fits.  Ribs mildly sore from coughing.  Difficulty sleeping due to coughing.  Ears were painful and plugged initially, improved.  No sore throat.  runny and stuffy nose.  Intermittent headaches.  No body aches.  No fevers initially, had fever of 103 on the 3rd day, lasted less than 24 hours, no fevers since then.  Appetite varies, fair.  Energy decreased, low.  Taking Nyquil.  Tried some Robitussin yesterday.  No inhaler use.  Daily smoker, about 1 ppd.            Past Medical History:     Diagnosis  Date     High triglycerides      History of MRI of brain and brain stem     Demyelination , Per patient, has had mini strokes      Past Surgical History:      Procedure  Laterality Date     APPENDECTOMY       NC REVISION CERVIX W PREG VAG APPRCH       TUBAL LIGATION       Social History        Substance Use Topics          Smoking status:   Current Every Day Smoker      Packs/day:  1.00      Years:  31.00      Types:  Cigarettes      Smokeless tobacco:   Never Used      Alcohol use   Yes      Comment: 2 per month       Current Outpatient Prescriptions       Medication  Sig Dispense Refill     acyclovir (ZOVIRAX) 400 mg tablet TAKE ONE TABLET BY MOUTH THREE TIMES A DAY FOR 5 DAYS AT ONSET OF SYMPTOMS 30 tablet 11     ADULT ASPIRIN EC LOW STRENGTH ORAL Take 1 tablet by mouth once  "daily.       atenolol (TENORMIN) 50 mg tablet Take 1 tablet by mouth once daily. 90 tablet 3     hydrochlorothiazide (HCTZ) 25 mg tablet Take 1 tablet by mouth once daily. 90 tablet 3     lisinopril (PRINIVIL; ZESTRIL) 20 mg tablet Take 1 tablet by mouth once daily. 90 tablet 3     LORazepam (ATIVAN) 0.5 mg tab Take 1 tablet by mouth every 6 hours if needed for Anxiety. 60 tablet 0     meclizine (ANTIVERT) 25 mg tablet Take 1 tablet by mouth 2 times daily if needed for Vertigo.  0     NITROSTAT 0.4 mg sublingual tablet PLACE 1 TABLET UNDER THE TONGUE EVERY 5 MINUTES IF NEEDED FOR CHESTPAIN. 30 tablet 0     pravastatin (PRAVACHOL) 20 mg tablet Take 1 tablet by mouth at bedtime. 90 tablet 3     sertraline (ZOLOFT) 100 mg tablet Take 1 tablet by mouth once daily. 90 tablet 3     No current facility-administered medications for this visit.      Medications have been reviewed by me and are current to the best of my knowledge and ability.    Allergies      Allergen   Reactions     Bupropion  Other - Describe In Comment Field     Facial and neck swelling      Niacin  Flushing     Sumatriptan  Other - Describe In Comment Field     \"burning veins\"        Past medical history, past surgical history, current medications and allergies reviewed and accurate to the best of my knowledge.        ROS:  Refer to HPI    /80  Pulse 69  Temp 97.4  F (36.3  C) (Tympanic)   Wt 68.5 kg (151 lb)  LMP 09/29/2015  Breastfeeding? No  BMI 26.54 kg/m2    EXAM:  General Appearance: Well appearing adult female, appropriate appearance for age. No acute distress  Head: normocephalic, atraumatic  Ears: Left TM with bony landmarks appreciated, no erythema, no effusion, no bulging, no purulence.  Right TM with bony landmarks appreciated, no erythema, no effusion, no bulging, no purulence.   Left auditory canal clear.  Right auditory canal clear.  Normal external ears, non tender.  Eyes: conjunctivae normal, no drainage  Orophayrnx: moist " mucous membranes, posterior pharynx without erythema, tonsils without hypertrophy, no erythema, no exudates or petechiae, no post nasal drip seen.    Neck: supple without adenopathy  Respiratory: normal chest wall and respirations.  Normal effort.  Mild wheezes and rhonchi to auscultation bilaterally.  No increased work of breathing.  Deep course congested wet cough appreciated  Cardiac: RRR with no murmurs  Musculoskeletal:  Normal gait.  Equal movement of bilateral upper extremities.  Equal movement of bilateral lower extremities.    Psychological: normal affect, alert and pleasant      Xray:  PROCEDURE:  XR CHEST 2 VIEWS PA AND LATERAL  HISTORY: Bronchitis, acute, with bronchospasm.  COMPARISON:  11/04/2012  FINDINGS:  The cardiomediastinal contours are normal.  The trachea is midline.  No focal consolidation, effusion or pneumothorax.    No suspicious osseous lesion or subdiaphragmatic free air.  IMPRESSION:    No acute cardiopulmonary process.  Stable chest.  Electronically Signed By: Fuad Jordan on 5/24/2017 2:35 PM      ASSESSMENT/PLAN:    ICD-10-CM    1. Bronchitis, acute, with bronchospasm J20.9 XR CHEST 2 VIEWS PA AND LATERAL      azithromycin (ZITHROMAX Z-ROLANDA) 250 mg tablet      predniSONE (DELTASONE) 20 mg tablet      albuterol HFA 90 mcg/actuation inhaler      codeine-guaiFENesin (ROBITUSSIN AC)  mg/5 mL liquid         CXR completed and reviewed, no infiltrate appreciated, radiologist over read normal  Azithromycin daily x 5 days for bronchitis with likely COPD from smoking  Prednisone 40 mg daily x 5 days, take with food  Albuterol inhaler Q 4 hours PRN  Robitussin with codeine at bedtime PRN  Encouraged fluids  Symptomatic treatment - salt water gargles, honey, elevation, humidifier, sinus rinse/netti pot, lozenges, etc   Tylenol or ibuprofen PRN  Follow up if symptoms persist or worsen or concerns        Patient Instructions   Azithromycin daily x 5 days    Prednisone 2 tabs daily x 5 days        Albuterol inhaler every 4 hours as needed    Robitussin with codeine at bedtime as needed          Most coughs are caused by a viral infection.   Usually coughs can last 2 to 3 weeks. Sometimes the cough becomes loose (wet) for a few days, and your child coughs up a lot of phlegm (mucus). This is usually a sign that the end of the illness is near.    Most sore throats are caused by viruses and are part of a cold. About 10% of sore throats are caused by strep bacteria.    Encouraged fluids and rest.    May use symptomatic care with tylenol or ibuprofen.     Using a humidifier works well to break up the congestion.     Elevate the mattress to 15 degrees in order to help with the congestion.    Frequent swallows of cool liquid.      Oatmeal or honey coats the throat and some patients find it soothes the pain.     Salt water gargles as needed    Return to clinic with change/worsening of symptoms or concerns.                 Name band;

## 2023-04-26 NOTE — ED ADULT TRIAGE NOTE - CHIEF COMPLAINT QUOTE
presents with healthcare proxy who is pt brother. pt has had cough for 2 months. has not seen PCP. denies fevers, chills. no asthma. cough does not subside with medications. PMH paranoid schizophrenia. denies smoking

## 2023-05-02 LAB
APPEARANCE: CLEAR
BACTERIA UR CULT: NORMAL
BACTERIA: NEGATIVE /HPF
BILIRUBIN URINE: NEGATIVE
BLOOD URINE: NEGATIVE
CAST: 0 /LPF
COLOR: YELLOW
EPITHELIAL CELLS: 0 /HPF
GLUCOSE QUALITATIVE U: NEGATIVE MG/DL
KETONES URINE: NEGATIVE MG/DL
LEUKOCYTE ESTERASE URINE: NEGATIVE
MICROSCOPIC-UA: NORMAL
NITRITE URINE: NEGATIVE
PH URINE: 7.5
PROTEIN URINE: NEGATIVE MG/DL
RED BLOOD CELLS URINE: 1 /HPF
SPECIFIC GRAVITY URINE: 1.02
UROBILINOGEN URINE: 1 MG/DL
WHITE BLOOD CELLS URINE: 0 /HPF

## 2023-05-04 ENCOUNTER — EMERGENCY (EMERGENCY)
Facility: HOSPITAL | Age: 48
LOS: 1 days | Discharge: HOME | End: 2023-05-04
Attending: EMERGENCY MEDICINE | Admitting: EMERGENCY MEDICINE
Payer: MEDICARE

## 2023-05-04 VITALS
DIASTOLIC BLOOD PRESSURE: 97 MMHG | HEIGHT: 67 IN | WEIGHT: 197.09 LBS | TEMPERATURE: 98 F | SYSTOLIC BLOOD PRESSURE: 168 MMHG | OXYGEN SATURATION: 97 % | HEART RATE: 100 BPM | RESPIRATION RATE: 22 BRPM

## 2023-05-04 PROCEDURE — 99285 EMERGENCY DEPT VISIT HI MDM: CPT

## 2023-05-04 PROCEDURE — 71250 CT THORAX DX C-: CPT | Mod: 26,MA

## 2023-05-04 RX ORDER — ALBUTEROL 90 UG/1
2.5 AEROSOL, METERED ORAL ONCE
Refills: 0 | Status: COMPLETED | OUTPATIENT
Start: 2023-05-04 | End: 2023-05-04

## 2023-05-04 RX ORDER — IPRATROPIUM/ALBUTEROL SULFATE 18-103MCG
3 AEROSOL WITH ADAPTER (GRAM) INHALATION ONCE
Refills: 0 | Status: COMPLETED | OUTPATIENT
Start: 2023-05-04 | End: 2023-05-04

## 2023-05-04 RX ADMIN — ALBUTEROL 2.5 MILLIGRAM(S): 90 AEROSOL, METERED ORAL at 21:48

## 2023-05-04 RX ADMIN — Medication 3 MILLILITER(S): at 22:24

## 2023-05-04 NOTE — ED ADULT TRIAGE NOTE - CHIEF COMPLAINT QUOTE
Pt feels SOB x2 months. Today is worse than it has been lately. Pt has a cough with white sputum. pt thinks he has asthma.

## 2023-05-04 NOTE — ED PROVIDER NOTE - NSFOLLOWUPCLINICS_GEN_ALL_ED_FT
[facility] Sleep [facility]  Pulmonology  [address] [place] [address], [place] [address]  Phone: [phone]  Fax:   Follow Up Time: Routine

## 2023-05-04 NOTE — ED PROVIDER NOTE - OBJECTIVE STATEMENT
Patient is a 48-year-old black male with history of paranoid schizophrenia presenting now to the emergency department at [facility] with his brother giving history that patient has had nonproductive cough for the past 2 to 3 months despite courses of antibiotics inhalers and steroids.  No fever no chills no vomiting no diarrhea no recent travel no hemoptysis.

## 2023-05-04 NOTE — ED PROVIDER NOTE - CONSTITUTIONAL, MLM
Disheveled appearing, Black male, awake, alert, oriented to person, place, in no apparent distress. normal...

## 2023-05-04 NOTE — ED ADULT TRIAGE NOTE - HEART RATE (BEATS/MIN)
3/9/2020 Last office visit, refill:    10/14/2019 [facility]-[address], CRYSTAL Gustafson PA-C   Outpatient Medication Detail      Disp Refills Start End    atorvastatin (LIPITOR) 10 MG tablet 90 tablet 1 10/14/2019     Sig - Route: Take 1 tablet by mouth daily. - Oral    Sent to pharmacy as: Atorvastatin Calcium 10 MG Oral Tablet      Over due for fasting labs, please advise.  Wt Readings from Last 1 Encounters:   03/09/20 110.8 kg        BP Readings from Last 2 Encounters:   03/09/20 138/88   01/09/20 128/72   ]    Lab Results   Component Value Date    SODIUM 140 12/19/2019    POTASSIUM 4.5 12/19/2019    CHLORIDE 107 12/19/2019    CO2 27 12/19/2019    BUN 31 (H) 12/19/2019    CREATININE 0.67 12/19/2019    GLUCOSE 81 12/19/2019     Hemoglobin A1C (%)   Date Value   01/19/2017 5.5     TSH (mcUnits/mL)   Date Value   12/19/2019 1.832     Lab Results   Component Value Date    CHOLESTEROL 158 04/09/2019    HDL 43 (L) 04/09/2019    CALCLDL 81 04/09/2019    TRIGLYCERIDE 168 (H) 04/09/2019     Lab Results   Component Value Date    AST 17 04/09/2019    GPT 25 04/09/2019    ALKPT 71 04/09/2019    BILIRUBIN 0.5 04/09/2019       
100

## 2023-05-04 NOTE — ED PROVIDER NOTE - CONSIDERATION OF ADMISSION OBSERVATION
Will consider escalation of care to include admission if CAT scan imaging suggests an entity necessitating inpatient evaluation and treatment. Consideration of Admission/Observation

## 2023-05-04 NOTE — ED PROVIDER NOTE - PROGRESS NOTE DETAILS
Pt signed out to me by Dr. Zhang to f/u CT chest  CT chest report reviewed, shows opacities possibly infectious. Will initiate Abx and advised outpt pulmonology follow up.

## 2023-05-04 NOTE — ED PROVIDER NOTE - CLINICAL SUMMARY MEDICAL DECISION MAKING FREE TEXT BOX
Chronic cough for the past 2 to 3 months here now requiring thorough evaluation CT imaging as well as medications in the form of bronchodilators.

## 2023-05-04 NOTE — ED ADULT NURSE NOTE - OBJECTIVE STATEMENT
No Vaccines Administered.
Patient received complaining of SOB for the past few months, as per care giver stated he has been coughing up white phlegm and has been seen multiple times with no improvement. Patient is AOx3, safety precautions in place, awaiting evaluation.

## 2023-05-04 NOTE — ED PROVIDER NOTE - DIFFERENTIAL DIAGNOSIS
Differential Diagnosis Differential diagnosis includes bronchitis/pneumonitis/bronchospasm/URI/underlying lung malignancy doubt

## 2023-05-04 NOTE — ED ADULT NURSE NOTE - NS_SISCREENINGSR_GEN_ALL_ED
Pharmacy Medication History  Admission medication history interview status for the 5/9/2022  admission is complete. See EPIC admission navigator for prior to admission medications     Location of Interview: Patient room  Medication history sources: Patient, Surescripts and Care Everywhere    Significant changes made to the medication list:  Removed oxycodone PRN Rx (written in 2017, pt confirmed no longer taking)   Added all on PTA med list below     In the past week, patient estimated taking medication this percent of the time: greater than 90%    Additional medication history information:   Patient is a reliable historian.     Medication reconciliation completed by provider prior to medication history? No    Time spent in this activity: 15 minutes    Prior to Admission medications    Medication Sig Last Dose Taking? Auth Provider   cyclobenzaprine (FLEXERIL) 5 MG tablet Take 5 mg by mouth 3 times daily as needed for muscle spasms 5/9/2022 at 1400 Yes Unknown, Entered By History   gabapentin (NEURONTIN) 300 MG capsule Take 300-600 mg by mouth At Bedtime 5/8/2022 at 300 mg @HS Yes Unknown, Entered By History   ibuprofen (ADVIL/MOTRIN) 200 MG tablet Take 400-800 mg by mouth every 8 hours as needed for mild pain 5/9/2022 at 800 mg @1400 Yes Unknown, Entered By History   levonorgestrel-ethinyl estradiol (KURVELO) 0.15-30 MG-MCG tablet Take 1 tablet by mouth daily 5/8/2022 at PM Yes Unknown, Entered By History   multivitamin w/minerals (THERA-VIT-M) tablet Take 1 tablet by mouth daily 5/9/2022 at Unknown time Yes Unknown, Entered By History   predniSONE (DELTASONE) 20 MG tablet Take 20 mg by mouth 2 times daily Prescribed 40 mg daily. Take for 7 days. 5/9/2022 at 20 mg in AM Yes Unknown, Entered By History   traMADol (ULTRAM) 50 MG tablet Take  mg by mouth every 8 hours as needed for severe pain 5/9/2022 at 50 mg @1400 Yes Unknown, Entered By History       The information provided in this note is only as accurate  as the sources available at the time of update(s)   Jose Alejandro ColonD     Negative

## 2023-05-04 NOTE — ED PROVIDER NOTE - PATIENT PORTAL LINK FT
You can access the FollowMyHealth Patient Portal offered by [facility] by registering at the following website: [email or web address]. By joining [facility]’s FollowMyHealth portal, you will also be able to view your health information using other applications (apps) compatible with our system.

## 2023-05-04 NOTE — ED PROVIDER NOTE - ATTESTATION, MLM
no cough/no decreased eating/drinking/no chills/no shortness of breath/no rash I have reviewed and confirmed nurses' notes for patient's medications, allergies, medical history, and surgical history.

## 2023-05-04 NOTE — ED ADULT TRIAGE NOTE - BP NONINVASIVE DIASTOLIC (MM HG)
97 Information: Selecting Yes will display possible errors in your note based on the variables you have selected. This validation is only offered as a suggestion for you. PLEASE NOTE THAT THE VALIDATION TEXT WILL BE REMOVED WHEN YOU FINALIZE YOUR NOTE. IF YOU WANT TO FAX A PRELIMINARY NOTE YOU WILL NEED TO TOGGLE THIS TO 'NO' IF YOU DO NOT WANT IT IN YOUR FAXED NOTE.

## 2023-05-05 PROCEDURE — 71250 CT THORAX DX C-: CPT | Mod: MA

## 2023-05-05 PROCEDURE — 99284 EMERGENCY DEPT VISIT MOD MDM: CPT | Mod: 25

## 2023-05-05 PROCEDURE — 94640 AIRWAY INHALATION TREATMENT: CPT

## 2023-05-05 RX ORDER — ALBUTEROL 90 UG/1
2 AEROSOL, METERED ORAL
Qty: 1 | Refills: 0
Start: 2023-05-05

## 2023-05-05 RX ADMIN — Medication 100 MILLIGRAM(S): at 01:51

## 2023-05-05 NOTE — ED ADULT NURSE REASSESSMENT NOTE - BEFAST SCREENING

## 2023-05-07 PROBLEM — N40.0 BPH WITHOUT OBSTRUCTION/LOWER URINARY TRACT SYMPTOMS: Noted: 2021-02-05

## 2023-05-07 NOTE — LETTER BODY
[Dear  ___] : Dear  [unfilled], [Courtesy Letter:] : I had the pleasure of seeing your patient, [unfilled], in my office today. [Please see my note below.] : Please see my note below. [Sincerely,] : Sincerely, [FreeTextEntry3] : Kenney Crenshaw MD\par  of Urology\par [facility]\par \par [facility]\par Offices:\par [address], [facility]\par [address] [address]\par [address] [facility], [address]\par \par TEL: [phone]\par FAX: [phone]

## 2023-05-07 NOTE — ASSESSMENT
[FreeTextEntry1] : Lower urinary tract symptoms:\par Benign Prostatic Hyperplasia:\par PVR: 0 ml. \par Will get Urinalysis and Urine culture.\par Prescribed Alfuzosin. Explained common side effects: nasal congestion, cough, muscle pain, back pain, dizziness, orthostatic hypotension, somnolence, retrograde ejaculation, decreased libido and erectile dysfunction. \par \par PSA Screening:\par Will get PSA at later date. \par \par Return to office in 4 weeks or sooner if any issues.

## 2023-05-07 NOTE — PHYSICAL EXAM
[Normal Appearance] : normal appearance [General Appearance - In No Acute Distress] : no acute distress [Abdomen Soft] : soft [Abdomen Tenderness] : non-tender [] : no respiratory distress [Oriented To Time, Place, And Person] : oriented to person, place, and time [Normal Station and Gait] : the gait and station were normal for the patient's age [FreeTextEntry1] : normal peripheral circulation

## 2023-05-07 NOTE — HISTORY OF PRESENT ILLNESS
[FreeTextEntry1] : Patient brought in by Brother and provided the history. \par Patient has Schizophrenia. \par \par 49 yo male presents for urinary frequency. \par Has frequency and urgency, every 45 minutes and nocturia 3 x. \par Denies dysuria, hematuria, lower abdominal or flank pain, nausea, vomiting, fever, chills or rigors. \par \par Seen on 2/5/21 for PSA Screening. \par Strong family history of Prostate cancer: Father, Paternal GF and uncles. \par Denied any difficulty with urination. Nocturia 1 x. \par Denied dysuria, hematuria, lower abdominal or flank pain, nausea, vomiting, fever, chills or rigors. \par \par Brother wanted brother to be tested for COVID antibody, as they have been exposed in the past with minimal symptoms.

## 2023-05-10 ENCOUNTER — APPOINTMENT (OUTPATIENT)
Dept: UROLOGY | Facility: CLINIC | Age: 48
End: 2023-05-10

## 2023-05-24 ENCOUNTER — APPOINTMENT (OUTPATIENT)
Dept: UROLOGY | Facility: CLINIC | Age: 48
End: 2023-05-24
Payer: MEDICARE

## 2023-05-24 DIAGNOSIS — R39.9 UNSPECIFIED SYMPTOMS AND SIGNS INVOLVING THE GENITOURINARY SYSTEM: ICD-10-CM

## 2023-05-24 DIAGNOSIS — Z12.5 ENCOUNTER FOR SCREENING FOR MALIGNANT NEOPLASM OF PROSTATE: ICD-10-CM

## 2023-05-24 DIAGNOSIS — N40.1 BENIGN PROSTATIC HYPERPLASIA WITH LOWER URINARY TRACT SYMPMS: ICD-10-CM

## 2023-05-24 DIAGNOSIS — N13.8 BENIGN PROSTATIC HYPERPLASIA WITH LOWER URINARY TRACT SYMPMS: ICD-10-CM

## 2023-05-24 PROCEDURE — 99214 OFFICE O/P EST MOD 30 MIN: CPT

## 2023-05-31 PROBLEM — R39.9 LOWER URINARY TRACT SYMPTOMS: Status: ACTIVE | Noted: 2023-05-07

## 2023-05-31 NOTE — PHYSICAL EXAM
[Urethral Meatus] : meatus normal [Penis Abnormality] : normal uncircumcised penis [Scrotum] : the scrotum was normal [Epididymis] : the epididymides were normal [Testes Tenderness] : no tenderness of the testes [Testes Mass (___cm)] : there were no testicular masses [Prostate Tenderness] : the prostate was not tender [No Prostate Nodules] : no prostate nodules [Prostate Size ___ (0-4)] : prostate size [unfilled] (scale: 0-4) [Normal Appearance] : normal appearance [General Appearance - In No Acute Distress] : no acute distress [Abdomen Soft] : soft [Abdomen Tenderness] : non-tender [] : no respiratory distress [Oriented To Time, Place, And Person] : oriented to person, place, and time [Normal Station and Gait] : the gait and station were normal for the patient's age

## 2023-05-31 NOTE — HISTORY OF PRESENT ILLNESS
[FreeTextEntry1] : Patient brought in by Brother and provided the history. \par Patient has Schizophrenia. \par \par 49 yo male presents for follow up. \par Taking Alfuzosin. Has better flow, daytime frequency 2 to 3 hours and nocturia 1 x. \par Denies dysuria, hematuria, lower abdominal or flank pain, nausea, vomiting, fever, chills or rigors. \par \par Seen on 4/26/23. \par Complained off urinary frequency. \par Had frequency and urgency, every 45 minutes and nocturia 3 x. \par Denied dysuria, hematuria, lower abdominal or flank pain, nausea, vomiting, fever, chills or rigors. \par \par Seen on 2/5/21 for PSA Screening. \par Strong family history of Prostate cancer: Father, Paternal GF and uncles. \par Denied any difficulty with urination. Nocturia 1 x. \par Denied dysuria, hematuria, lower abdominal or flank pain, nausea, vomiting, fever, chills or rigors. \par \par Brother wanted brother to be tested for COVID antibody, as they have been exposed in the past with minimal symptoms.

## 2023-05-31 NOTE — ASSESSMENT
[FreeTextEntry1] : Reviewed records.\par Discussed labs. \par \par Benign Prostatic Hyperplasia:\par Lower urinary tract symptoms: \par Discussed treatment options, will continue Alfuzosin. \par \par PSA Screening:\par Obtained PSA today. \par \par Will inform results. \par Return to office in 1 year or sooner if any issues: will do Uroflo/PVR.

## 2023-05-31 NOTE — LETTER BODY
[Dear  ___] : Dear  [unfilled], [Courtesy Letter:] : I had the pleasure of seeing your patient, [unfilled], in my office today. [Please see my note below.] : Please see my note below. [Sincerely,] : Sincerely, [FreeTextEntry3] : Kenney Crenshaw MD\par  of Urology\par [facility]\par \par [facility]\par Offices:\par [address] [facility], [facility]\par [address] [address]\par [address] [facility], [address]\par \par TEL: [phone]\par FAX: [phone]

## 2023-06-01 LAB — PSA SERPL-MCNC: 0.36 NG/ML

## 2023-10-12 RX ORDER — ALFUZOSIN HYDROCHLORIDE 10 MG/1
10 TABLET, EXTENDED RELEASE ORAL DAILY
Qty: 90 | Refills: 1 | Status: ACTIVE | COMMUNITY
Start: 2023-04-26 | End: 1900-01-01

## 2023-10-12 RX ORDER — SODIUM SULFATE, POTASSIUM SULFATE AND MAGNESIUM SULFATE 1.6; 3.13; 17.5 G/177ML; G/177ML; G/177ML
17.5-3.13-1.6 SOLUTION ORAL
Qty: 1 | Refills: 0 | Status: COMPLETED | COMMUNITY
Start: 2022-12-20 | End: 2023-10-12

## 2023-10-12 RX ORDER — SODIUM SULFATE, POTASSIUM SULFATE AND MAGNESIUM SULFATE 1.6; 3.13; 17.5 G/177ML; G/177ML; G/177ML
17.5-3.13-1.6 SOLUTION ORAL
Qty: 1 | Refills: 0 | Status: COMPLETED | COMMUNITY
Start: 2022-12-21 | End: 2023-10-12

## 2023-10-12 RX ORDER — POLYETHYLENE GLYCOL-3350 AND ELECTROLYTES 236; 6.74; 5.86; 2.97; 22.74 G/274.31G; G/274.31G; G/274.31G; G/274.31G; G/274.31G
236 POWDER, FOR SOLUTION ORAL
Qty: 1 | Refills: 0 | Status: COMPLETED | COMMUNITY
Start: 2022-12-20 | End: 2023-10-12

## 2023-10-12 RX ORDER — POLYETHYLENE GLYCOL-3350 AND ELECTROLYTES WITH FLAVOR PACK 240; 5.84; 2.98; 6.72; 22.72 G/278.26G; G/278.26G; G/278.26G; G/278.26G; G/278.26G
240 POWDER, FOR SOLUTION ORAL
Qty: 1 | Refills: 0 | Status: COMPLETED | COMMUNITY
Start: 2022-01-03 | End: 2023-10-12

## 2023-10-12 RX ORDER — CEFUROXIME AXETIL 500 MG/1
500 TABLET ORAL
Qty: 14 | Refills: 0 | Status: COMPLETED | COMMUNITY
Start: 2023-04-26 | End: 2023-10-12

## 2023-11-03 NOTE — ED ADULT NURSE NOTE - [facility] ED NUR SYMPTOMS POS
Rx Refill Note  Requested Prescriptions     Pending Prescriptions Disp Refills    ramelteon (ROZEREM) 8 MG tablet [Pharmacy Med Name: RAMELTEON TABS 8MG] 90 tablet 3     Sig: TAKE 1 TABLET EVERY NIGHT      Last office visit with prescribing clinician: 6/15/2023   Last telemedicine visit with prescribing clinician: Visit date not found   Next office visit with prescribing clinician: Visit date not found                         Would you like a call back once the refill request has been completed: [] Yes [] No    If the office needs to give you a call back, can they leave a voicemail: [] Yes [] No    Gloria Mckay MA  11/03/23, 11:20 EDT   chocking

## 2025-04-18 NOTE — ED ADULT NURSE NOTE - PERIPHERAL VASCULAR
[FreeTextEntry6] : rash started on his face 2 days ago  now one of the areas is open no fever, no URI symptoms  slight sniffle last week  eating/drinking well no meds
- - -

## (undated) DEVICE — SYR 60CC G MANOM

## (undated) DEVICE — ELCTR GROUNDING PAD ADULT COVIDIEN

## (undated) DEVICE — NDL INJ SCLERO INTERJECT 23G

## (undated) DEVICE — TUBING IV SET SECOND 34" W/O LOK-BLUNT

## (undated) DEVICE — SUT HEWSON RETRIEVER

## (undated) DEVICE — TUBE RECTAL 24FR

## (undated) DEVICE — FORCEP RADIAL JAW 4 W NDL 2.2MM 2.8MM 160CM YELLOW DISP

## (undated) DEVICE — RETRIEVER ROTH NET PLATINUM-UNIVERSAL

## (undated) DEVICE — SYR SLIP TIP 60CC

## (undated) DEVICE — PACK IV START WITH CHG

## (undated) DEVICE — CATH IV SAFE BC 20G X 1.16" (PINK)

## (undated) DEVICE — TUBING SUCTION CONN 6FT STERILE

## (undated) DEVICE — FORCEP RADIAL JAW 4 W NDL 2.4MM 2.8MM 240CM ORANGE DISP

## (undated) DEVICE — FORMALIN CUPS 10% BUFFERED

## (undated) DEVICE — FORCEP RADIAL JAW 4 JUMBO 2.8MM 3.2MM 240CM ORANGE DISP

## (undated) DEVICE — SNARE LRG

## (undated) DEVICE — SYR IV POSIFLUSH NS 3ML 30/TY

## (undated) DEVICE — TUBING ENDO EXT OLYMPUS 160 24HR USE

## (undated) DEVICE — ENDOCUFF VISION SZ 2 LG GRN

## (undated) DEVICE — TUBE O2 SUPL CRUSH RESIS CONN SOUTHSIDE ONLY

## (undated) DEVICE — TUBING IV SET GRAVITY 3Y 100" MACRO

## (undated) DEVICE — POLY TRAP ETRAP

## (undated) DEVICE — SYR LUER SLIP TIP 50CC

## (undated) DEVICE — SOL IRR POUR H2O 500ML

## (undated) DEVICE — SENS OXI DGT OXISENSOR II ADLN

## (undated) DEVICE — TRAP QUICK CATCH  SINGL CHAMBER

## (undated) DEVICE — TRAP E POLY

## (undated) DEVICE — MASK O2 NON REBREATH 3IN1 ADULT

## (undated) DEVICE — MASK OXYGEN PANORAMIC

## (undated) DEVICE — TUBING IV SET SECONDARY 34"

## (undated) DEVICE — MARKER ENDO SPOT EX

## (undated) DEVICE — ENDOCUFF VISION SZ 3 SM PRPL

## (undated) DEVICE — STERIS DEFENDO 3-PIECE KIT (AIR/WATER, SUCTION & BIOPSY VALVES)

## (undated) DEVICE — SNARE POLYP SENS 27MM 240CM

## (undated) DEVICE — VALVE BIOPSY

## (undated) DEVICE — BITE BLOCK MAXI RUBBER STAMP

## (undated) DEVICE — CATH IV SAFE BC BLU 22GAX1.0"

## (undated) DEVICE — CATH IV SAFE BC 22G X 1" (BLUE)

## (undated) DEVICE — BRUSH COLONOSCOPY CYTOLOGY

## (undated) DEVICE — SUCTION YANKAUER TAPERED BULBOUS NO VENT

## (undated) DEVICE — RADIAL JAW 4 LG CAPACITY WITH NDL

## (undated) DEVICE — CATH IV SAFE BC PINK 20GA X 1.16"

## (undated) DEVICE — CATH ELECHMSTAT  INJ 7FR 210CM

## (undated) DEVICE — SYR SLIP LOK 30CC

## (undated) DEVICE — Device

## (undated) DEVICE — TUBING CANNULA SALTER LABS NASAL ADULT 7FT

## (undated) DEVICE — SNARE CAPTIVATOR II RND COLD 10MM

## (undated) DEVICE — BRUSH CYTO ENDO

## (undated) DEVICE — SYR LUER SLIP TIP 30CC

## (undated) DEVICE — VALVE ENDOSCOPE DEFENDO SINGLE USE

## (undated) DEVICE — CANISTER SUCTION 1200CC 10/SL

## (undated) DEVICE — DRSG 4X4

## (undated) DEVICE — SENSOR O2 FINGER XL ADULT 24/BX 6BX/CA